# Patient Record
Sex: FEMALE | Race: WHITE | Employment: UNEMPLOYED | URBAN - METROPOLITAN AREA
[De-identification: names, ages, dates, MRNs, and addresses within clinical notes are randomized per-mention and may not be internally consistent; named-entity substitution may affect disease eponyms.]

---

## 2017-02-28 ENCOUNTER — OFFICE VISIT (OUTPATIENT)
Dept: INTERNAL MEDICINE CLINIC | Facility: CLINIC | Age: 37
End: 2017-02-28

## 2017-02-28 VITALS
RESPIRATION RATE: 18 BRPM | WEIGHT: 207 LBS | HEIGHT: 66.5 IN | BODY MASS INDEX: 32.87 KG/M2 | SYSTOLIC BLOOD PRESSURE: 102 MMHG | DIASTOLIC BLOOD PRESSURE: 71 MMHG | TEMPERATURE: 99 F | HEART RATE: 76 BPM

## 2017-02-28 DIAGNOSIS — M25.531 PAIN IN BOTH WRISTS: Primary | ICD-10-CM

## 2017-02-28 DIAGNOSIS — E66.09 OBESITY DUE TO EXCESS CALORIES, UNSPECIFIED OBESITY SEVERITY: ICD-10-CM

## 2017-02-28 DIAGNOSIS — M25.532 PAIN IN BOTH WRISTS: Primary | ICD-10-CM

## 2017-02-28 PROCEDURE — 99214 OFFICE O/P EST MOD 30 MIN: CPT | Performed by: INTERNAL MEDICINE

## 2017-02-28 PROCEDURE — 99212 OFFICE O/P EST SF 10 MIN: CPT | Performed by: INTERNAL MEDICINE

## 2017-02-28 NOTE — PROGRESS NOTES
HPI:    Patient ID: Edy Frazier is a 29year old female. Hand Pain   The pain is present in the right wrist and left elbow. This is a recurrent (10  days    afer  yoga  exercisee   feels pain  in  right  wrist  and  in   fingers   ) problem.  The cu Tympanic membrane, external ear and ear canal normal.   Nose: Nose normal. Right sinus exhibits no maxillary sinus tenderness and no frontal sinus tenderness. Left sinus exhibits no maxillary sinus tenderness and no frontal sinus tenderness.    Mouth/Throat To  Use     Obesity due to excess calories, unspecified obesity severity  Eat healthy, well balanced meals   Reduce daily calorie intake  (1500 calories per day)   Reach and maintain a healthy weight   Reduce salt, fat, sweets and pure sugar in diet   Incl

## 2017-03-01 ENCOUNTER — LAB ENCOUNTER (OUTPATIENT)
Dept: LAB | Facility: HOSPITAL | Age: 37
End: 2017-03-01
Attending: INTERNAL MEDICINE
Payer: COMMERCIAL

## 2017-03-01 DIAGNOSIS — E66.09 OBESITY DUE TO EXCESS CALORIES, UNSPECIFIED OBESITY SEVERITY: ICD-10-CM

## 2017-03-01 DIAGNOSIS — M25.532 PAIN IN BOTH WRISTS: ICD-10-CM

## 2017-03-01 DIAGNOSIS — M25.531 PAIN IN BOTH WRISTS: ICD-10-CM

## 2017-03-01 LAB
ALBUMIN SERPL BCP-MCNC: 4.2 G/DL (ref 3.5–4.8)
ALBUMIN/GLOB SERPL: 1.2 {RATIO} (ref 1–2)
ALP SERPL-CCNC: 41 U/L (ref 32–100)
ALT SERPL-CCNC: 15 U/L (ref 14–54)
ANION GAP SERPL CALC-SCNC: 7 MMOL/L (ref 0–18)
AST SERPL-CCNC: 19 U/L (ref 15–41)
BACTERIA UR QL AUTO: NEGATIVE /HPF
BASOPHILS # BLD: 0 K/UL (ref 0–0.2)
BASOPHILS NFR BLD: 1 %
BILIRUB SERPL-MCNC: 0.9 MG/DL (ref 0.3–1.2)
BILIRUB UR QL: NEGATIVE
BUN SERPL-MCNC: 10 MG/DL (ref 8–20)
BUN/CREAT SERPL: 12 (ref 10–20)
CALCIUM SERPL-MCNC: 9.4 MG/DL (ref 8.5–10.5)
CCP IGG SERPL-ACNC: 1 U/ML (ref 0–6.9)
CHLORIDE SERPL-SCNC: 104 MMOL/L (ref 95–110)
CHOLEST SERPL-MCNC: 158 MG/DL (ref 110–200)
CO2 SERPL-SCNC: 28 MMOL/L (ref 22–32)
COLOR UR: YELLOW
CREAT SERPL-MCNC: 0.83 MG/DL (ref 0.5–1.5)
EOSINOPHIL # BLD: 0.5 K/UL (ref 0–0.7)
EOSINOPHIL NFR BLD: 7 %
ERYTHROCYTE [DISTWIDTH] IN BLOOD BY AUTOMATED COUNT: 13.6 % (ref 11–15)
ERYTHROCYTE [SEDIMENTATION RATE] IN BLOOD: 12 MM/HR (ref 0–20)
GLOBULIN PLAS-MCNC: 3.5 G/DL (ref 2.5–3.7)
GLUCOSE SERPL-MCNC: 88 MG/DL (ref 70–99)
GLUCOSE UR-MCNC: NEGATIVE MG/DL
HCT VFR BLD AUTO: 42.7 % (ref 35–48)
HDLC SERPL-MCNC: 42 MG/DL
HGB BLD-MCNC: 14.3 G/DL (ref 12–16)
KETONES UR-MCNC: NEGATIVE MG/DL
LDLC SERPL CALC-MCNC: 98 MG/DL (ref 0–99)
LYMPHOCYTES # BLD: 2.1 K/UL (ref 1–4)
LYMPHOCYTES NFR BLD: 34 %
MCH RBC QN AUTO: 30 PG (ref 27–32)
MCHC RBC AUTO-ENTMCNC: 33.4 G/DL (ref 32–37)
MCV RBC AUTO: 90 FL (ref 80–100)
MONOCYTES # BLD: 0.4 K/UL (ref 0–1)
MONOCYTES NFR BLD: 7 %
NEUTROPHILS # BLD AUTO: 3.2 K/UL (ref 1.8–7.7)
NEUTROPHILS NFR BLD: 51 %
NITRITE UR QL STRIP.AUTO: NEGATIVE
NONHDLC SERPL-MCNC: 116 MG/DL
OSMOLALITY UR CALC.SUM OF ELEC: 286 MOSM/KG (ref 275–295)
PH UR: 5 [PH] (ref 5–8)
PLATELET # BLD AUTO: 256 K/UL (ref 140–400)
PMV BLD AUTO: 9 FL (ref 7.4–10.3)
POTASSIUM SERPL-SCNC: 4.3 MMOL/L (ref 3.3–5.1)
PROT SERPL-MCNC: 7.7 G/DL (ref 5.9–8.4)
PROT UR-MCNC: NEGATIVE MG/DL
RBC # BLD AUTO: 4.74 M/UL (ref 3.7–5.4)
RBC #/AREA URNS AUTO: 2 /HPF
RHEUMATOID FACT SER QL: <5 IU/ML
SODIUM SERPL-SCNC: 139 MMOL/L (ref 136–144)
SP GR UR STRIP: 1.02 (ref 1–1.03)
TRIGL SERPL-MCNC: 88 MG/DL (ref 1–149)
TSH SERPL-ACNC: 2.81 UIU/ML (ref 0.34–5.6)
URATE SERPL-MCNC: 4.4 MG/DL (ref 2.1–7.4)
UROBILINOGEN UR STRIP-ACNC: <2
VIT C UR-MCNC: NEGATIVE MG/DL
WBC # BLD AUTO: 6.2 K/UL (ref 4–11)
WBC #/AREA URNS AUTO: 4 /HPF

## 2017-03-01 PROCEDURE — 80053 COMPREHEN METABOLIC PANEL: CPT

## 2017-03-01 PROCEDURE — 86431 RHEUMATOID FACTOR QUANT: CPT

## 2017-03-01 PROCEDURE — 80061 LIPID PANEL: CPT

## 2017-03-01 PROCEDURE — 86200 CCP ANTIBODY: CPT

## 2017-03-01 PROCEDURE — 85025 COMPLETE CBC W/AUTO DIFF WBC: CPT

## 2017-03-01 PROCEDURE — 84550 ASSAY OF BLOOD/URIC ACID: CPT

## 2017-03-01 PROCEDURE — 81001 URINALYSIS AUTO W/SCOPE: CPT

## 2017-03-01 PROCEDURE — 85652 RBC SED RATE AUTOMATED: CPT

## 2017-03-01 PROCEDURE — 84443 ASSAY THYROID STIM HORMONE: CPT

## 2017-03-01 PROCEDURE — 36415 COLL VENOUS BLD VENIPUNCTURE: CPT

## 2017-03-01 PROCEDURE — 86038 ANTINUCLEAR ANTIBODIES: CPT

## 2017-03-02 LAB — ANA SER QL: NEGATIVE

## 2017-03-09 ENCOUNTER — TELEPHONE (OUTPATIENT)
Dept: INTERNAL MEDICINE CLINIC | Facility: CLINIC | Age: 37
End: 2017-03-09

## 2017-03-09 ENCOUNTER — OFFICE VISIT (OUTPATIENT)
Dept: ORTHOPEDICS CLINIC | Facility: CLINIC | Age: 37
End: 2017-03-09

## 2017-03-09 DIAGNOSIS — G56.03 BILATERAL CARPAL TUNNEL SYNDROME: Primary | ICD-10-CM

## 2017-03-09 PROCEDURE — 99212 OFFICE O/P EST SF 10 MIN: CPT | Performed by: ORTHOPAEDIC SURGERY

## 2017-03-09 PROCEDURE — 99243 OFF/OP CNSLTJ NEW/EST LOW 30: CPT | Performed by: ORTHOPAEDIC SURGERY

## 2017-03-10 NOTE — TELEPHONE ENCOUNTER
Patient was called and blood test results ( Labs done 03/01/17 ) were explained and reviewed with the patient. Patient verbalized understanding and agreed to medical recommendations made by Dr. Arabella Draper. No further questions/ concerns at this time.

## 2017-03-22 ENCOUNTER — TELEPHONE (OUTPATIENT)
Dept: NEUROLOGY | Facility: CLINIC | Age: 37
End: 2017-03-22

## 2017-03-23 ENCOUNTER — OFFICE VISIT (OUTPATIENT)
Dept: NEUROLOGY | Facility: CLINIC | Age: 37
End: 2017-03-23

## 2017-03-23 VITALS — BODY MASS INDEX: 31.39 KG/M2 | WEIGHT: 200 LBS | HEIGHT: 67 IN

## 2017-03-23 DIAGNOSIS — M79.601 PARESTHESIA AND PAIN OF BOTH UPPER EXTREMITIES: Primary | ICD-10-CM

## 2017-03-23 DIAGNOSIS — M79.602 PARESTHESIA AND PAIN OF BOTH UPPER EXTREMITIES: Primary | ICD-10-CM

## 2017-03-23 DIAGNOSIS — R20.2 PARESTHESIA AND PAIN OF BOTH UPPER EXTREMITIES: Primary | ICD-10-CM

## 2017-03-27 NOTE — PROCEDURES
[address]  [place], [place]anda Mann  Phone: [phone]  Fax: [phone] REPORT          Patient: Teto Serrano YOB: 1980  Patient ID: [id number] Hand Dominance: Wrist APB 2.81 14.3 6.20 Wrist - APB 8        Elbow APB 7.19 14.0 6.20 Elbow - Wrist 25 4.38 57   L MEDIAN - APB      Wrist APB 2.55 10.5 6.04 Wrist - APB 8        Elbow APB 6.46 10.1 6.20 Elbow - Wrist 23 3.91 59   R ULNAR - ADM      Wrist ADM 2.40 10. L. FLEX CARPI RAD N None None None None N N N N   L. FIRST D INTEROSS N None None None None N N N N   L. ABD POLL BREVIS N None None None None N N N N   L. EXT INDICIS N None None None None N N N N                                 -mk

## 2017-04-03 ENCOUNTER — TELEPHONE (OUTPATIENT)
Dept: ORTHOPEDICS CLINIC | Facility: CLINIC | Age: 37
End: 2017-04-03

## 2017-04-18 ENCOUNTER — OFFICE VISIT (OUTPATIENT)
Dept: ORTHOPEDICS CLINIC | Facility: CLINIC | Age: 37
End: 2017-04-18

## 2017-04-18 VITALS — HEART RATE: 96 BPM | DIASTOLIC BLOOD PRESSURE: 74 MMHG | SYSTOLIC BLOOD PRESSURE: 110 MMHG | RESPIRATION RATE: 16 BRPM

## 2017-04-18 DIAGNOSIS — G56.03 BILATERAL CARPAL TUNNEL SYNDROME: Primary | ICD-10-CM

## 2017-04-18 DIAGNOSIS — G56.23 CUBITAL TUNNEL SYNDROME, BILATERAL: ICD-10-CM

## 2017-04-18 PROCEDURE — 20526 THER INJECTION CARP TUNNEL: CPT | Performed by: ORTHOPAEDIC SURGERY

## 2017-04-18 PROCEDURE — 99213 OFFICE O/P EST LOW 20 MIN: CPT | Performed by: ORTHOPAEDIC SURGERY

## 2017-04-18 RX ORDER — BETAMETHASONE SODIUM PHOSPHATE AND BETAMETHASONE ACETATE 3; 3 MG/ML; MG/ML
18 INJECTION, SUSPENSION INTRA-ARTICULAR; INTRALESIONAL; INTRAMUSCULAR; SOFT TISSUE ONCE
Status: COMPLETED | OUTPATIENT
Start: 2017-04-18 | End: 2017-04-18

## 2017-04-18 RX ADMIN — BETAMETHASONE SODIUM PHOSPHATE AND BETAMETHASONE ACETATE 18 MG: 3; 3 INJECTION, SUSPENSION INTRA-ARTICULAR; INTRALESIONAL; INTRAMUSCULAR; SOFT TISSUE at 10:10:00

## 2017-04-18 NOTE — PROGRESS NOTES
Per verbal order from [facility], draw up 1.5ml of 1% lidocaine and 1.5ml of Celestone for cortisone injection to right wrist. Carin Higgins RN

## 2017-04-18 NOTE — PROGRESS NOTES
4/18/2017  Nichelle Jane  8/19/1980  29year old   female  Deisy Mast MD    HPI:   Patient presents with:  Test Results: Pt here for EMG results. The patient complains of pain located bilateral hands. The pain is the same.   The patient repo The patient will monitor the results of the injection. She will continue her night splint and try to avoid elbow flexion and medial elbow pressure. All of their questions were answered and they are in agreement with the treatment plan.     The patient w

## 2017-04-18 NOTE — PROCEDURES
The patient desired injection. Under sterile conditions and following informed consent, the patient was injected with lidocaine and celestone in their bilateral carpal tunnels. The patient tolerated the procedure well and there were no complications.   Chiqui Guillermo

## 2017-11-01 ENCOUNTER — TELEPHONE (OUTPATIENT)
Dept: PEDIATRICS CLINIC | Facility: CLINIC | Age: 37
End: 2017-11-01

## 2017-11-01 DIAGNOSIS — N92.6 IRREGULAR MENSTRUAL CYCLE: ICD-10-CM

## 2017-11-01 DIAGNOSIS — Z30.431 SURVEILLANCE OF PREVIOUSLY PRESCRIBED INTRAUTERINE CONTRACEPTIVE DEVICE: Primary | ICD-10-CM

## 2017-11-01 NOTE — TELEPHONE ENCOUNTER
PT NOTIFIED OF RECS. SINCE PELVIC ULTRASOUND ORDER  YESTERDAY ANOTHER ORDER HAS BEEN PLACED. ADVISED IF SHE BECOMES SYMPTOMATIC (CHEST PAIN, SOB, HEART PALPITATIONS OR LIGHTHEADEDNESS) TO GO TO THE ER.   I GAVE HER THE PHONE NUMBER FOR CENTRAL SCHE

## 2017-11-01 NOTE — TELEPHONE ENCOUNTER
C/O PARAGARD IUD X 4 YEARS. STATES REGULAR, [facility] UNTIL October. BEGAN BLEEDING 2 WEEKS EARLY ON 10-18 AND BLEEDING GOT HEAVIER AS OF 10-20. HAS BEEN SATURATING A PAD Q 3 HOURS FOR 4-5 DAYS NOW.   PT HAS NOT DONE ANY HPT AND IS N

## 2017-11-01 NOTE — TELEPHONE ENCOUNTER
Pt has IUD for 4 years . Pt is now spotting since oct 18. And then started bleeding  Since oct 20th , heavy flow .  Please advise

## 2017-11-01 NOTE — TELEPHONE ENCOUNTER
If she has been under stress, not unusual to have 2 periods in a month. Would monitor. But [facility] ordered her pelvic u/s in 10/2016 that she has not done.   Would do u/s

## 2017-11-06 ENCOUNTER — HOSPITAL ENCOUNTER (OUTPATIENT)
Dept: ULTRASOUND IMAGING | Facility: HOSPITAL | Age: 37
Discharge: HOME | End: 2017-11-06
Attending: OBSTETRICS & GYNECOLOGY
Payer: COMMERCIAL

## 2017-11-06 DIAGNOSIS — N92.6 IRREGULAR MENSTRUAL CYCLE: ICD-10-CM

## 2017-11-06 DIAGNOSIS — Z30.431 SURVEILLANCE OF PREVIOUSLY PRESCRIBED INTRAUTERINE CONTRACEPTIVE DEVICE: ICD-10-CM

## 2017-11-06 PROCEDURE — 76830 TRANSVAGINAL US NON-OB: CPT | Performed by: OBSTETRICS & GYNECOLOGY

## 2017-11-06 PROCEDURE — 76856 US EXAM PELVIC COMPLETE: CPT | Performed by: OBSTETRICS & GYNECOLOGY

## 2017-11-08 ENCOUNTER — TELEPHONE (OUTPATIENT)
Dept: OBGYN CLINIC | Facility: CLINIC | Age: 37
End: 2017-11-08

## 2017-11-08 NOTE — TELEPHONE ENCOUNTER
Pt calling for US results. Pt states she has had heavy bleeding for 21 of the past 25 days. She is aware to go to ER if SOB, CP, or heart palpitations. She denies these symptoms at this time but states she is really tired of bleeding and looking for recs.

## 2017-11-08 NOTE — TELEPHONE ENCOUNTER
Pt notified of results and recs per Cedric Goldberg 8141. Pt accepted appt with VALENTINA on 11/10/17.

## 2017-11-08 NOTE — TELEPHONE ENCOUNTER
Pelvic u/s essentially normal.  Paragard IUD can make periods heavy. MAF pt. I last saw pt in 2012.   Needs to be seen for recommendations

## 2017-11-09 ENCOUNTER — LAB ENCOUNTER (OUTPATIENT)
Dept: LAB | Facility: HOSPITAL | Age: 37
End: 2017-11-09
Attending: OBSTETRICS & GYNECOLOGY
Payer: COMMERCIAL

## 2017-11-09 ENCOUNTER — OFFICE VISIT (OUTPATIENT)
Dept: OBGYN CLINIC | Facility: CLINIC | Age: 37
End: 2017-11-09

## 2017-11-09 VITALS
HEIGHT: 65.75 IN | BODY MASS INDEX: 33.7 KG/M2 | WEIGHT: 207.19 LBS | DIASTOLIC BLOOD PRESSURE: 74 MMHG | HEART RATE: 83 BPM | SYSTOLIC BLOOD PRESSURE: 110 MMHG

## 2017-11-09 DIAGNOSIS — Z01.419 ENCOUNTER FOR GYNECOLOGICAL EXAMINATION WITHOUT ABNORMAL FINDING: Primary | ICD-10-CM

## 2017-11-09 DIAGNOSIS — N92.0 MENORRHAGIA WITH REGULAR CYCLE: ICD-10-CM

## 2017-11-09 DIAGNOSIS — Z30.431 IUD CHECK UP: ICD-10-CM

## 2017-11-09 PROCEDURE — 36415 COLL VENOUS BLD VENIPUNCTURE: CPT

## 2017-11-09 PROCEDURE — 84443 ASSAY THYROID STIM HORMONE: CPT

## 2017-11-09 PROCEDURE — 85027 COMPLETE CBC AUTOMATED: CPT

## 2017-11-09 PROCEDURE — 84703 CHORIONIC GONADOTROPIN ASSAY: CPT

## 2017-11-09 PROCEDURE — 99395 PREV VISIT EST AGE 18-39: CPT | Performed by: OBSTETRICS & GYNECOLOGY

## 2017-11-09 RX ORDER — MEDROXYPROGESTERONE ACETATE 10 MG/1
10 TABLET ORAL DAILY
Qty: 10 TABLET | Refills: 0 | Status: SHIPPED | OUTPATIENT
Start: 2017-11-09 | End: 2019-03-15 | Stop reason: ALTCHOICE

## 2017-11-09 NOTE — PROGRESS NOTES
Duarte Leon is a 40year old female [id number] Patient's last menstrual period was 10/18/2017. Patient presents with:  Gyn Exam: annual exam  Gyn Problem: prolonged menses  . Last period lasted 3 weeks & heavy.  Wanted paragard to avoid hormones since did Comment: SOCIALLY    Drug use: No    Sexual activity: Yes    Partners: Male    Birth control/ protection: Paragard    Comment: PARAGARD 12/21/2012     Other Topics Concern    Caffeine Concern Yes    Comment: 1 cup coffee daily     Social History Narrat soft, nontender, nondistended, no masses  Skin/Hair:  no unusual rashes or bruises  Extremities:  no edema, no cyanosis  Psychiatric:   oriented to time, place, person and situation.  Appropriate mood and affect    Pelvic Exam:  External Genitalia:  normal

## 2019-03-15 ENCOUNTER — OFFICE VISIT (OUTPATIENT)
Dept: FAMILY MEDICINE CLINIC | Facility: CLINIC | Age: 39
End: 2019-03-15
Payer: COMMERCIAL

## 2019-03-15 VITALS
TEMPERATURE: 98 F | WEIGHT: 213 LBS | DIASTOLIC BLOOD PRESSURE: 72 MMHG | SYSTOLIC BLOOD PRESSURE: 118 MMHG | BODY MASS INDEX: 34.64 KG/M2 | HEIGHT: 65.75 IN | RESPIRATION RATE: 16 BRPM | OXYGEN SATURATION: 99 % | HEART RATE: 80 BPM

## 2019-03-15 DIAGNOSIS — R53.83 OTHER FATIGUE: ICD-10-CM

## 2019-03-15 DIAGNOSIS — Z97.5 IUD (INTRAUTERINE DEVICE) IN PLACE: ICD-10-CM

## 2019-03-15 DIAGNOSIS — Z13.39 ALCOHOL SCREENING: ICD-10-CM

## 2019-03-15 DIAGNOSIS — E66.9 OBESITY (BMI 30.0-34.9): ICD-10-CM

## 2019-03-15 DIAGNOSIS — Z71.82 EXERCISE COUNSELING: ICD-10-CM

## 2019-03-15 DIAGNOSIS — N92.1 MENORRHAGIA WITH IRREGULAR CYCLE: ICD-10-CM

## 2019-03-15 DIAGNOSIS — Z00.00 WELL ADULT EXAM: Primary | ICD-10-CM

## 2019-03-15 DIAGNOSIS — Z13.220 LIPID SCREENING: ICD-10-CM

## 2019-03-15 DIAGNOSIS — Z76.89 ENCOUNTER TO ESTABLISH CARE: ICD-10-CM

## 2019-03-15 DIAGNOSIS — Z71.85 VACCINE COUNSELING: ICD-10-CM

## 2019-03-15 PROBLEM — G56.23 CUBITAL TUNNEL SYNDROME, BILATERAL: Status: RESOLVED | Noted: 2017-04-18 | Resolved: 2019-03-15

## 2019-03-15 PROBLEM — M25.531 PAIN IN BOTH WRISTS: Status: RESOLVED | Noted: 2017-02-28 | Resolved: 2019-03-15

## 2019-03-15 PROBLEM — G56.03 BILATERAL CARPAL TUNNEL SYNDROME: Status: RESOLVED | Noted: 2017-03-09 | Resolved: 2019-03-15

## 2019-03-15 PROBLEM — M25.532 PAIN IN BOTH WRISTS: Status: RESOLVED | Noted: 2017-02-28 | Resolved: 2019-03-15

## 2019-03-15 PROCEDURE — 99385 PREV VISIT NEW AGE 18-39: CPT | Performed by: FAMILY MEDICINE

## 2019-03-15 PROCEDURE — 99203 OFFICE O/P NEW LOW 30 MIN: CPT | Performed by: FAMILY MEDICINE

## 2019-03-15 RX ORDER — COPPER 313.4 MG/1
INTRAUTERINE DEVICE INTRAUTERINE
COMMUNITY

## 2019-03-15 NOTE — PATIENT INSTRUCTIONS
Prevention Guidelines, Women Ages 25 to 44  Screening tests and vaccines are an important part of managing your health. A screening test is done to find possible disorders or diseases in people who don't have any symptoms.  The goal is to find a disease e Type 2 diabetes All women with prediabetes Every year   Gonorrhea Sexually active women at increased risk for infection At routine exams   Hepatitis C Anyone at increased risk At routine exams   HIV All women should be tested at least once for HIV between Meningococcal Women at increased risk for infection should talk with their healthcare provider 1 or more doses   Pneumococcal conjugate vaccine (PCV13) and pneumococcal polysaccharide vaccine (PPSV23) Women at increased risk for infection should talk with © 1784-5586 [employer] [address]. [facility] [facility] [place] [place]. All rights reserved. This information is not intended as a substitute for professional medical care. Always follow your healthcare professional's instructions.

## 2019-03-15 NOTE — PROGRESS NOTES
Patient presents with:  Physical: Routine physical- pap needed however she is ending her period and she will schedule another appointment when she is completely done with her period.  flu shot and Tdap:UTD      HPI:    Patient is here for a physical. She wi Hematological: Negative for adenopathy. Does not bruise/bleed easily. Psychiatric/Behavioral: The patient is not nervous/anxious. No depression.     Patient Active Problem List:     Obesity due to excess calories     Menorrhagia with irregular cycle respiratory distress. No wheezes, rhonchi or rales  Abdominal: Soft. Bowel sounds are normal. Non tender, no masses, no organomegaly   Musculoskeletal: Normal range of motion. No edema and no tenderness. Lymphadenopathy: No cervical adenopathy.    Neurolo screening  Encounter to establish care  Exercise counseling  Iud (intrauterine device) in place  Other fatigue  Lipid screening  Obesity (bmi 30.0-34.9)     - -Discussed weight loss methods including diet and exercise, counseled on safe sex, std risks, vac

## 2019-03-22 ENCOUNTER — OFFICE VISIT (OUTPATIENT)
Dept: FAMILY MEDICINE CLINIC | Facility: CLINIC | Age: 39
End: 2019-03-22
Payer: COMMERCIAL

## 2019-03-22 ENCOUNTER — LAB ENCOUNTER (OUTPATIENT)
Dept: LAB | Age: 39
End: 2019-03-22
Attending: FAMILY MEDICINE
Payer: COMMERCIAL

## 2019-03-22 VITALS
TEMPERATURE: 98 F | HEART RATE: 74 BPM | WEIGHT: 213 LBS | DIASTOLIC BLOOD PRESSURE: 80 MMHG | RESPIRATION RATE: 16 BRPM | HEIGHT: 65.75 IN | BODY MASS INDEX: 34.64 KG/M2 | SYSTOLIC BLOOD PRESSURE: 122 MMHG | OXYGEN SATURATION: 99 %

## 2019-03-22 DIAGNOSIS — Z13.31 DEPRESSION SCREENING: ICD-10-CM

## 2019-03-22 DIAGNOSIS — E66.9 OBESITY (BMI 30.0-34.9): ICD-10-CM

## 2019-03-22 DIAGNOSIS — R53.83 OTHER FATIGUE: ICD-10-CM

## 2019-03-22 DIAGNOSIS — Z11.3 SCREEN FOR STD (SEXUALLY TRANSMITTED DISEASE): ICD-10-CM

## 2019-03-22 DIAGNOSIS — Z12.4 CERVICAL CANCER SCREENING: ICD-10-CM

## 2019-03-22 DIAGNOSIS — Z00.00 WELL ADULT EXAM: ICD-10-CM

## 2019-03-22 DIAGNOSIS — Z01.419 WELL WOMAN EXAM WITH ROUTINE GYNECOLOGICAL EXAM: Primary | ICD-10-CM

## 2019-03-22 LAB
ALBUMIN SERPL-MCNC: 4 G/DL (ref 3.4–5)
ALBUMIN/GLOB SERPL: 1 {RATIO} (ref 1–2)
ALP LIVER SERPL-CCNC: 56 U/L (ref 37–98)
ALT SERPL-CCNC: 35 U/L (ref 13–56)
ANION GAP SERPL CALC-SCNC: 6 MMOL/L (ref 0–18)
AST SERPL-CCNC: 24 U/L (ref 15–37)
BASOPHILS # BLD AUTO: 0.05 X10(3) UL (ref 0–0.2)
BASOPHILS NFR BLD AUTO: 0.9 %
BILIRUB SERPL-MCNC: 0.5 MG/DL (ref 0.1–2)
BUN BLD-MCNC: 10 MG/DL (ref 7–18)
BUN/CREAT SERPL: 11.5 (ref 10–20)
CALCIUM BLD-MCNC: 9 MG/DL (ref 8.5–10.1)
CHLORIDE SERPL-SCNC: 107 MMOL/L (ref 98–107)
CHOLEST SMN-MCNC: 187 MG/DL (ref ?–200)
CO2 SERPL-SCNC: 27 MMOL/L (ref 21–32)
CREAT BLD-MCNC: 0.87 MG/DL (ref 0.55–1.02)
DEPRECATED RDW RBC AUTO: 43.8 FL (ref 35.1–46.3)
EOSINOPHIL # BLD AUTO: 0.64 X10(3) UL (ref 0–0.7)
EOSINOPHIL NFR BLD AUTO: 11.3 %
ERYTHROCYTE [DISTWIDTH] IN BLOOD BY AUTOMATED COUNT: 12.7 % (ref 11–15)
EST. AVERAGE GLUCOSE BLD GHB EST-MCNC: 111 MG/DL (ref 68–126)
GLOBULIN PLAS-MCNC: 4 G/DL (ref 2.8–4.4)
GLUCOSE BLD-MCNC: 65 MG/DL (ref 70–99)
HBA1C MFR BLD HPLC: 5.5 % (ref ?–5.7)
HCT VFR BLD AUTO: 42.7 % (ref 35–48)
HDLC SERPL-MCNC: 55 MG/DL (ref 40–59)
HGB BLD-MCNC: 14 G/DL (ref 12–16)
IMM GRANULOCYTES # BLD AUTO: 0.01 X10(3) UL (ref 0–1)
IMM GRANULOCYTES NFR BLD: 0.2 %
LDLC SERPL CALC-MCNC: 117 MG/DL (ref ?–100)
LYMPHOCYTES # BLD AUTO: 1.98 X10(3) UL (ref 1–4)
LYMPHOCYTES NFR BLD AUTO: 35.1 %
M PROTEIN MFR SERPL ELPH: 8 G/DL (ref 6.4–8.2)
MCH RBC QN AUTO: 30.6 PG (ref 26–34)
MCHC RBC AUTO-ENTMCNC: 32.8 G/DL (ref 31–37)
MCV RBC AUTO: 93.2 FL (ref 80–100)
MONOCYTES # BLD AUTO: 0.49 X10(3) UL (ref 0.1–1)
MONOCYTES NFR BLD AUTO: 8.7 %
NEUTROPHILS # BLD AUTO: 2.47 X10 (3) UL (ref 1.5–7.7)
NEUTROPHILS # BLD AUTO: 2.47 X10(3) UL (ref 1.5–7.7)
NEUTROPHILS NFR BLD AUTO: 43.8 %
NONHDLC SERPL-MCNC: 132 MG/DL (ref ?–130)
OSMOLALITY SERPL CALC.SUM OF ELEC: 287 MOSM/KG (ref 275–295)
PLATELET # BLD AUTO: 293 10(3)UL (ref 150–450)
POTASSIUM SERPL-SCNC: 4.5 MMOL/L (ref 3.5–5.1)
RBC # BLD AUTO: 4.58 X10(6)UL (ref 3.8–5.3)
SODIUM SERPL-SCNC: 140 MMOL/L (ref 136–145)
TRIGL SERPL-MCNC: 74 MG/DL (ref 30–149)
TSI SER-ACNC: 2.85 MIU/ML (ref 0.36–3.74)
VLDLC SERPL CALC-MCNC: 15 MG/DL (ref 0–30)
WBC # BLD AUTO: 5.6 X10(3) UL (ref 4–11)

## 2019-03-22 PROCEDURE — 83036 HEMOGLOBIN GLYCOSYLATED A1C: CPT | Performed by: FAMILY MEDICINE

## 2019-03-22 PROCEDURE — 87624 HPV HI-RISK TYP POOLED RSLT: CPT | Performed by: FAMILY MEDICINE

## 2019-03-22 PROCEDURE — 36415 COLL VENOUS BLD VENIPUNCTURE: CPT | Performed by: FAMILY MEDICINE

## 2019-03-22 PROCEDURE — 88175 CYTOPATH C/V AUTO FLUID REDO: CPT | Performed by: FAMILY MEDICINE

## 2019-03-22 PROCEDURE — 87491 CHLMYD TRACH DNA AMP PROBE: CPT | Performed by: FAMILY MEDICINE

## 2019-03-22 PROCEDURE — 99395 PREV VISIT EST AGE 18-39: CPT | Performed by: FAMILY MEDICINE

## 2019-03-22 PROCEDURE — 87591 N.GONORRHOEAE DNA AMP PROB: CPT | Performed by: FAMILY MEDICINE

## 2019-03-22 NOTE — PROGRESS NOTES
HPI:   Shawn Michaud is a 41year old female that presents for well woman exam.     Pap: 2 years ago and it was normal , hx of abnormal: No  STI testing desired: Yes  Menstrual Issues: Heavy menses as noted on last physical. She will be getting a pelvic discharge, cervix normal appearing   EXTREMITIES:  No edema, no cyanosis, no clubbing, 2+ dorsalis pedis pulses bilaterally. NEURO:  Grossly normal, gait stable     ASSESSMENT AND PLAN:      1.  Well woman exam with routine gynecological exam  - -Discussed

## 2019-03-24 LAB
C TRACH DNA SPEC QL NAA+PROBE: NEGATIVE
HPV I/H RISK 1 DNA SPEC QL NAA+PROBE: NEGATIVE
N GONORRHOEA DNA SPEC QL NAA+PROBE: NEGATIVE

## 2019-04-01 ENCOUNTER — OFFICE VISIT (OUTPATIENT)
Dept: FAMILY MEDICINE CLINIC | Facility: CLINIC | Age: 39
End: 2019-04-01
Payer: COMMERCIAL

## 2019-04-01 VITALS
HEIGHT: 65.75 IN | WEIGHT: 215 LBS | TEMPERATURE: 98 F | DIASTOLIC BLOOD PRESSURE: 68 MMHG | RESPIRATION RATE: 17 BRPM | HEART RATE: 74 BPM | BODY MASS INDEX: 34.97 KG/M2 | OXYGEN SATURATION: 98 % | SYSTOLIC BLOOD PRESSURE: 120 MMHG

## 2019-04-01 DIAGNOSIS — H10.13 ACUTE ATOPIC CONJUNCTIVITIS OF BOTH EYES: Primary | ICD-10-CM

## 2019-04-01 PROCEDURE — 99213 OFFICE O/P EST LOW 20 MIN: CPT | Performed by: PHYSICIAN ASSISTANT

## 2019-04-01 RX ORDER — OLOPATADINE HYDROCHLORIDE 2 MG/ML
1 SOLUTION/ DROPS OPHTHALMIC DAILY
Qty: 1 BOTTLE | Refills: 0 | Status: SHIPPED | OUTPATIENT
Start: 2019-04-01 | End: 2019-04-08

## 2019-04-01 NOTE — PROGRESS NOTES
CHIEF COMPLAINT:   Patient presents with:  Eye Pain: R eye hurts\" crusted and swollen this morning . HPI:   Eva Mccurdy is a 41year old female who presents with chief complaint of b/l eye irritation for 2 days. Right is worse then the left.  3 otherwise  SKIN: no rashes  EYES:  See HPI  HENT: denies ear pain, congestion, sore throat  LUNGS: denies shortness of breath or cough  CARDIOVASCULAR: denies chest pain or palpitations   GI: denies N/V/C or abdominal pain    EXAM:   /68   Pulse 74 drop daily   Ok to continue antihistamine   Please follow up with PCP if no improvement or if symptoms worsen

## 2019-04-01 NOTE — PATIENT INSTRUCTIONS
Eye drop daily   Ok to continue antihistamine   Please follow up with PCP if no improvement or if symptoms worsen

## 2019-05-13 ENCOUNTER — OFFICE VISIT (OUTPATIENT)
Dept: FAMILY MEDICINE CLINIC | Facility: CLINIC | Age: 39
End: 2019-05-13
Payer: COMMERCIAL

## 2019-05-13 VITALS
OXYGEN SATURATION: 98 % | BODY MASS INDEX: 34.48 KG/M2 | HEIGHT: 65.75 IN | TEMPERATURE: 98 F | SYSTOLIC BLOOD PRESSURE: 100 MMHG | WEIGHT: 212 LBS | RESPIRATION RATE: 16 BRPM | HEART RATE: 81 BPM | DIASTOLIC BLOOD PRESSURE: 70 MMHG

## 2019-05-13 DIAGNOSIS — R05.8 SPASMODIC COUGH: ICD-10-CM

## 2019-05-13 DIAGNOSIS — H61.21 IMPACTED CERUMEN OF RIGHT EAR: ICD-10-CM

## 2019-05-13 DIAGNOSIS — J06.9 VIRAL URI WITH COUGH: ICD-10-CM

## 2019-05-13 DIAGNOSIS — J02.9 SORE THROAT: Primary | ICD-10-CM

## 2019-05-13 PROCEDURE — 99213 OFFICE O/P EST LOW 20 MIN: CPT | Performed by: PHYSICIAN ASSISTANT

## 2019-05-13 PROCEDURE — 87880 STREP A ASSAY W/OPTIC: CPT | Performed by: PHYSICIAN ASSISTANT

## 2019-05-13 RX ORDER — FLUTICASONE PROPIONATE 50 MCG
2 SPRAY, SUSPENSION (ML) NASAL DAILY
Qty: 1 INHALER | Refills: 0 | Status: SHIPPED | OUTPATIENT
Start: 2019-05-13 | End: 2019-10-18 | Stop reason: ALTCHOICE

## 2019-05-13 RX ORDER — BENZONATATE 200 MG/1
200 CAPSULE ORAL 3 TIMES DAILY PRN
Qty: 30 CAPSULE | Refills: 0 | Status: SHIPPED | OUTPATIENT
Start: 2019-05-13 | End: 2019-05-23

## 2019-05-13 RX ORDER — ALBUTEROL SULFATE 90 UG/1
1-2 AEROSOL, METERED RESPIRATORY (INHALATION) EVERY 6 HOURS PRN
Qty: 1 INHALER | Refills: 0 | Status: SHIPPED | OUTPATIENT
Start: 2019-05-13 | End: 2019-10-18 | Stop reason: ALTCHOICE

## 2019-05-13 NOTE — PROGRESS NOTES
CHIEF COMPLAINT:   Patient presents with:  Cough: productive cough, sore throat, right ear pain, sinus pressure, x2 day      HPI:   Bobo Levy is a 41year old female who presents for URI symptoms for  1.5 days.   Patient reports nasal congestion, sin LUNGS: denies shortness of breath or wheezing, See HPI  CARDIOVASCULAR: denies chest pain or palpitations   GI: denies N/V/C or abdominal pain  NEURO: + headaches    EXAM:   /70   Pulse 81   Temp 98.3 °F (36.8 °C) (Oral)   Resp 16   Ht 65.75\"   Wt 2 Prep Hydrogen Peroxide  Otoscope visualization of cerumen and the curette was not used to remove the wax  Patient Status: Tolerated well.   No complications      ASSESSMENT AND PLAN:   Manuel Kelly is a 41year old female who presents with symptoms that · If symptoms are severe, rest at home for the first 2 to 3 days. When you resume activity, don't let yourself get too tired. · Don't smoke. If you need help stopping, talk with your healthcare provider.   · Avoid being exposed to cigarette smoke (yours or Date Last Reviewed: 6/1/2018  © 8156-5458 [employer] [address]. [address] [facility] [place] [address] [place]. All rights reserved. This information is not intended as a substitute for professional medical care.  Always follow your healthcare professional'

## 2019-05-13 NOTE — PATIENT INSTRUCTIONS
-Push fluids  -Cool mist humidifier at night  -Sudafed  -Motrin  -Go to ER with worsening symptoms      Viral Upper Respiratory Illness (Adult)    You have a viral upper respiratory illness (URI), which is another term for the common cold.  This illness is your healthcare provider or pharmacist which over-the-counter medicines are safe to use. (Note: Don't use decongestants if you have high blood pressure.)  Follow-up care  Follow up with your healthcare provider, or as advised.   When to seek medical advice

## 2019-10-18 ENCOUNTER — OFFICE VISIT (OUTPATIENT)
Dept: FAMILY MEDICINE CLINIC | Facility: CLINIC | Age: 39
End: 2019-10-18
Payer: COMMERCIAL

## 2019-10-18 VITALS
SYSTOLIC BLOOD PRESSURE: 112 MMHG | TEMPERATURE: 98 F | WEIGHT: 211 LBS | DIASTOLIC BLOOD PRESSURE: 72 MMHG | BODY MASS INDEX: 34.32 KG/M2 | HEART RATE: 74 BPM | OXYGEN SATURATION: 99 % | RESPIRATION RATE: 14 BRPM | HEIGHT: 65.75 IN

## 2019-10-18 DIAGNOSIS — G56.03 BILATERAL CARPAL TUNNEL SYNDROME: ICD-10-CM

## 2019-10-18 DIAGNOSIS — M77.11 LATERAL EPICONDYLITIS OF RIGHT ELBOW: Primary | ICD-10-CM

## 2019-10-18 PROCEDURE — 99214 OFFICE O/P EST MOD 30 MIN: CPT | Performed by: FAMILY MEDICINE

## 2019-10-18 NOTE — PROGRESS NOTES
HPI:    Shawn Michaud is a 46year old female. Patient presents with:  Carpal Tunnel Syndrome: Hand pain. - She was given cortisone shots in both wrists 4 years ago and now is getting sharp pains in her right wrist up her arm.  Health maintenance is up to HPI.  GENERAL HEALTH: denies weight loss of significance, denies fatigue, fever or chills.   HENT: denies blurry vision, double vision, hearing ailments, dysphagia, odynophagia  SKIN: denies any unusual skin lesions or rashes  RESPIRATORY: denies shortness TENDERNESS:  Focal tenderness right wrist. No focal tenderness elsewhere. No observable/palpable soft tissue mass. . Palpated defects absent. Exam: has tenderness at right lateral epicondyle.    NEUROVASCULAR: Distal pulses intact, capillary refill < 2 sec

## 2019-10-18 NOTE — PATIENT INSTRUCTIONS
Understanding Lateral Epicondylitis    Tendons are strong bands of tissue that connect muscles to bones. Lateral epicondylitis affects the tendons that connect muscles in the forearm to the lateral epicondyle.  This is the bony knob on the outer side of t · Taking pain medicines. Taking prescription or over-the-counter pain medicines may help reduce pain and swelling.    · Wearing a brace. This helps reduce strain on the muscles and tendons in the forearm, which may relieve symptoms.  It is very important to With carpal tunnel syndrome, the tendons or nearby tissues within the carpal tunnel may swell or thicken. Or the transverse carpal ligament may harden and shorten. This narrows the space in the carpal tunnel and puts pressure on the median nerve.  This pres · Surgery. If the condition doesn’t respond to other treatments and doesn’t go away on its own, you may need surgery.  During surgery, the surgeon cuts the transverse carpal ligament to relieve pressure on the median nerve.     When to call your healthcare

## 2019-10-19 PROBLEM — G56.03 BILATERAL CARPAL TUNNEL SYNDROME: Status: ACTIVE | Noted: 2019-10-19

## 2019-10-19 PROBLEM — M77.11 LATERAL EPICONDYLITIS OF RIGHT ELBOW: Status: ACTIVE | Noted: 2019-10-19

## 2020-06-18 ENCOUNTER — OFFICE VISIT (OUTPATIENT)
Dept: OBGYN CLINIC | Facility: CLINIC | Age: 40
End: 2020-06-18
Payer: COMMERCIAL

## 2020-06-18 VITALS
SYSTOLIC BLOOD PRESSURE: 116 MMHG | DIASTOLIC BLOOD PRESSURE: 70 MMHG | HEIGHT: 65.75 IN | BODY MASS INDEX: 32.53 KG/M2 | WEIGHT: 200 LBS

## 2020-06-18 DIAGNOSIS — N94.6 DYSMENORRHEA: ICD-10-CM

## 2020-06-18 DIAGNOSIS — N92.1 MENORRHAGIA WITH IRREGULAR CYCLE: Primary | ICD-10-CM

## 2020-06-18 DIAGNOSIS — L65.9 HAIR LOSS: ICD-10-CM

## 2020-06-18 PROCEDURE — 99203 OFFICE O/P NEW LOW 30 MIN: CPT | Performed by: OBSTETRICS & GYNECOLOGY

## 2020-06-19 NOTE — PROGRESS NOTES
OB/GYN H&P       CC: Patient presents with:  Irregular Periods: Patient has had irregular cycles in the past sometimes late a week after / more iregular since Jan 2020 , patient had no cycle in May , hair loss       HPI: Jimi Bucio is a 46year old G History of pregnancy 2004, 2006, 2010, 2012    2004-SAB; 2006-17 hr labor , Rajinder Page ([facility]); 2010-6 hr labor , \"Kelly\" DWAYNE; 2012-, APGAR:9 (1 min) 9 (5 min)  (10 min)  -1st degree perineal laceration    • Menorrhagia with irr [facility]    LH (LUTEINIZING HORMONE)    TSH W REFLEX TO FREE T4    PROLACTIN    US TRANSVAG/ABDOMINAL EMG ONLY      Other Visit Diagnoses     Dysmenorrhea        Relevant Orders    US TRANSVAG/ABDOMINAL EMG ONLY    Hair loss        Relevant Orders    VITAMIN D,

## 2020-06-24 ENCOUNTER — APPOINTMENT (OUTPATIENT)
Dept: OBGYN CLINIC | Facility: CLINIC | Age: 40
End: 2020-06-24
Payer: COMMERCIAL

## 2020-06-24 DIAGNOSIS — N94.6 DYSMENORRHEA: ICD-10-CM

## 2020-06-24 DIAGNOSIS — N92.1 MENORRHAGIA WITH IRREGULAR CYCLE: ICD-10-CM

## 2020-06-24 PROCEDURE — 76830 TRANSVAGINAL US NON-OB: CPT | Performed by: OBSTETRICS & GYNECOLOGY

## 2020-06-24 PROCEDURE — 76856 US EXAM PELVIC COMPLETE: CPT | Performed by: OBSTETRICS & GYNECOLOGY

## 2020-06-25 ENCOUNTER — LAB ENCOUNTER (OUTPATIENT)
Dept: LAB | Age: 40
End: 2020-06-25
Attending: OBSTETRICS & GYNECOLOGY
Payer: COMMERCIAL

## 2020-06-25 DIAGNOSIS — N92.1 MENORRHAGIA WITH IRREGULAR CYCLE: ICD-10-CM

## 2020-06-25 DIAGNOSIS — L65.9 HAIR LOSS: ICD-10-CM

## 2020-06-25 PROCEDURE — 83002 ASSAY OF GONADOTROPIN (LH): CPT

## 2020-06-25 PROCEDURE — 82306 VITAMIN D 25 HYDROXY: CPT

## 2020-06-25 PROCEDURE — 84146 ASSAY OF PROLACTIN: CPT

## 2020-06-25 PROCEDURE — 84443 ASSAY THYROID STIM HORMONE: CPT

## 2020-06-25 PROCEDURE — 83001 ASSAY OF GONADOTROPIN (FSH): CPT

## 2020-06-25 PROCEDURE — 36415 COLL VENOUS BLD VENIPUNCTURE: CPT

## 2020-06-26 NOTE — PROGRESS NOTES
Reviewed result. FSH is high, which indicates menopause. Going through it before 36years of age is considered \"Prmature\" and could be caused by a few possible reasons:  1) familial: 10% of cases.  Check with the women in your immediate family and inqui

## 2020-09-23 ENCOUNTER — IMMUNIZATION (OUTPATIENT)
Dept: FAMILY MEDICINE CLINIC | Facility: CLINIC | Age: 40
End: 2020-09-23
Payer: COMMERCIAL

## 2020-09-23 DIAGNOSIS — Z23 NEED FOR VACCINATION: ICD-10-CM

## 2020-09-23 PROCEDURE — 90471 IMMUNIZATION ADMIN: CPT | Performed by: FAMILY MEDICINE

## 2020-09-23 PROCEDURE — 90686 IIV4 VACC NO PRSV 0.5 ML IM: CPT | Performed by: FAMILY MEDICINE

## 2020-10-07 ENCOUNTER — TELEPHONE (OUTPATIENT)
Dept: FAMILY MEDICINE CLINIC | Facility: CLINIC | Age: 40
End: 2020-10-07

## 2020-10-07 ENCOUNTER — VIRTUAL PHONE E/M (OUTPATIENT)
Dept: FAMILY MEDICINE CLINIC | Facility: CLINIC | Age: 40
End: 2020-10-07
Payer: COMMERCIAL

## 2020-10-07 DIAGNOSIS — Z20.822 CLOSE EXPOSURE TO COVID-19 VIRUS: Primary | ICD-10-CM

## 2020-10-07 PROCEDURE — 99213 OFFICE O/P EST LOW 20 MIN: CPT | Performed by: FAMILY MEDICINE

## 2020-10-07 NOTE — PROGRESS NOTES
Telephone Check-In    Lety Alba verbally consents to a Virtual/Telephone Check-In service on 10/07/20. Patient understands and accepts financial responsibility for any deductible, co-insurance and/or co-pays associated with this service.     [place]

## 2020-10-07 NOTE — TELEPHONE ENCOUNTER
Future Appointments   Date Time Provider Brian Caldera   10/7/2020  2:15 PM Kaila Ramirez MD [facility] [address] [facility]     Patient verbally consents to a virtual/telephone check-in service for the date and time noted above.  Patient understands and accepts

## 2020-10-07 NOTE — PATIENT INSTRUCTIONS
Thank you for choosing Danny Mazariegos MD at [place] [address]  To Do: Nichelle Jane  1. Coronavirus Disease 2019 (COVID-19)     [place] [address] is committed to the safety and well-being of our patients, members, employees, and communities.  As 2. Monitor your symptoms carefully. If your symptoms get worse, call your healthcare provider immediately. 3. Get rest and stay hydrated.    4. If you have a medical appointment, call the healthcare provider ahead of time and tell them that you have or may ? At least 24 hours have passed since recovery defined as resolution of fever without the use of fever-reducing medications; and  · Improvement in respiratory symptoms (e.g., cough, shortness of breath); and  · At least 10 days have passed since symptoms f If you would be interested in donating your plasma to help treat others diagnosed with the virus, please contact [facility] directly on their website: [email or web address]    Who is eligible to donate convalescent plasma? • You can NOW use [employer] to book your appointments with us, or consider using open access scheduling which is through the [employer]ward website [email or web address] and type in Cady Avila MD and follow the links for \"Schedule Online Now\"    •To sc Please allow our office 5 business days to contact you regarding any testing results. Refill policies:   Allow 3 business days for refills; controlled substances may take longer and must be picked up from the office in person.   Narcotic medications can

## 2020-10-08 ENCOUNTER — APPOINTMENT (OUTPATIENT)
Dept: LAB | Age: 40
End: 2020-10-08
Attending: FAMILY MEDICINE
Payer: COMMERCIAL

## 2020-10-08 DIAGNOSIS — Z20.822 CLOSE EXPOSURE TO COVID-19 VIRUS: ICD-10-CM

## 2021-04-09 DIAGNOSIS — Z23 NEED FOR VACCINATION: ICD-10-CM

## 2021-04-21 ENCOUNTER — PATIENT MESSAGE (OUTPATIENT)
Dept: FAMILY MEDICINE CLINIC | Facility: CLINIC | Age: 41
End: 2021-04-21

## 2021-04-21 NOTE — TELEPHONE ENCOUNTER
From: Chula Geller  To: Roberto Neighbours, DO  Sent: 4/21/2021 1:38 PM CDT  Subject: Other    Wanted to let you know that I have been vaccinated for Covid-19. Please see attached to update your records.      Nichelle Jane  [phone]

## 2021-09-10 ENCOUNTER — LAB ENCOUNTER (OUTPATIENT)
Dept: LAB | Age: 41
End: 2021-09-10
Attending: FAMILY MEDICINE
Payer: COMMERCIAL

## 2021-09-10 ENCOUNTER — OFFICE VISIT (OUTPATIENT)
Dept: FAMILY MEDICINE CLINIC | Facility: CLINIC | Age: 41
End: 2021-09-10
Payer: COMMERCIAL

## 2021-09-10 ENCOUNTER — HOSPITAL ENCOUNTER (OUTPATIENT)
Dept: MAMMOGRAPHY | Age: 41
Discharge: HOME | End: 2021-09-10
Attending: FAMILY MEDICINE
Payer: COMMERCIAL

## 2021-09-10 VITALS
HEIGHT: 65.75 IN | WEIGHT: 219 LBS | DIASTOLIC BLOOD PRESSURE: 78 MMHG | HEART RATE: 74 BPM | TEMPERATURE: 98 F | RESPIRATION RATE: 16 BRPM | OXYGEN SATURATION: 98 % | BODY MASS INDEX: 35.62 KG/M2 | SYSTOLIC BLOOD PRESSURE: 122 MMHG

## 2021-09-10 DIAGNOSIS — Z12.31 SCREENING MAMMOGRAM, ENCOUNTER FOR: ICD-10-CM

## 2021-09-10 DIAGNOSIS — Z00.00 WELL ADULT EXAM: ICD-10-CM

## 2021-09-10 DIAGNOSIS — N92.1 MENORRHAGIA WITH IRREGULAR CYCLE: ICD-10-CM

## 2021-09-10 DIAGNOSIS — Z00.00 WELL ADULT EXAM: Primary | ICD-10-CM

## 2021-09-10 DIAGNOSIS — E66.09 CLASS 2 OBESITY DUE TO EXCESS CALORIES WITHOUT SERIOUS COMORBIDITY WITH BODY MASS INDEX (BMI) OF 35.0 TO 35.9 IN ADULT: ICD-10-CM

## 2021-09-10 LAB
ALBUMIN SERPL-MCNC: 3.4 G/DL (ref 3.4–5)
ALBUMIN/GLOB SERPL: 0.9 {RATIO} (ref 1–2)
ALP LIVER SERPL-CCNC: 54 U/L
ALT SERPL-CCNC: 28 U/L
ANION GAP SERPL CALC-SCNC: 5 MMOL/L (ref 0–18)
AST SERPL-CCNC: 18 U/L (ref 15–37)
BASOPHILS # BLD AUTO: 0.06 X10(3) UL (ref 0–0.2)
BASOPHILS NFR BLD AUTO: 1.1 %
BILIRUB SERPL-MCNC: 0.4 MG/DL (ref 0.1–2)
BUN BLD-MCNC: 9 MG/DL (ref 7–18)
CALCIUM BLD-MCNC: 8.7 MG/DL (ref 8.5–10.1)
CHLORIDE SERPL-SCNC: 109 MMOL/L (ref 98–112)
CHOLEST SMN-MCNC: 167 MG/DL (ref ?–200)
CO2 SERPL-SCNC: 26 MMOL/L (ref 21–32)
CREAT BLD-MCNC: 0.7 MG/DL
EOSINOPHIL # BLD AUTO: 0.53 X10(3) UL (ref 0–0.7)
EOSINOPHIL NFR BLD AUTO: 9.5 %
ERYTHROCYTE [DISTWIDTH] IN BLOOD BY AUTOMATED COUNT: 12.9 %
GLOBULIN PLAS-MCNC: 3.9 G/DL (ref 2.8–4.4)
GLUCOSE BLD-MCNC: 77 MG/DL (ref 70–99)
HCT VFR BLD AUTO: 39.2 %
HDLC SERPL-MCNC: 52 MG/DL (ref 40–59)
HGB BLD-MCNC: 13.3 G/DL
IMM GRANULOCYTES # BLD AUTO: 0.02 X10(3) UL (ref 0–1)
IMM GRANULOCYTES NFR BLD: 0.4 %
LDLC SERPL CALC-MCNC: 99 MG/DL (ref ?–100)
LYMPHOCYTES # BLD AUTO: 2.1 X10(3) UL (ref 1–4)
LYMPHOCYTES NFR BLD AUTO: 37.8 %
M PROTEIN MFR SERPL ELPH: 7.3 G/DL (ref 6.4–8.2)
MCH RBC QN AUTO: 30.5 PG (ref 26–34)
MCHC RBC AUTO-ENTMCNC: 33.9 G/DL (ref 31–37)
MCV RBC AUTO: 89.9 FL
MONOCYTES # BLD AUTO: 0.52 X10(3) UL (ref 0.1–1)
MONOCYTES NFR BLD AUTO: 9.4 %
NEUTROPHILS # BLD AUTO: 2.33 X10 (3) UL (ref 1.5–7.7)
NEUTROPHILS # BLD AUTO: 2.33 X10(3) UL (ref 1.5–7.7)
NEUTROPHILS NFR BLD AUTO: 41.8 %
NONHDLC SERPL-MCNC: 115 MG/DL (ref ?–130)
OSMOLALITY SERPL CALC.SUM OF ELEC: 287 MOSM/KG (ref 275–295)
PATIENT FASTING Y/N/NP: YES
PATIENT FASTING Y/N/NP: YES
PLATELET # BLD AUTO: 248 10(3)UL (ref 150–450)
POTASSIUM SERPL-SCNC: 4.2 MMOL/L (ref 3.5–5.1)
RBC # BLD AUTO: 4.36 X10(6)UL
SODIUM SERPL-SCNC: 140 MMOL/L (ref 136–145)
T4 FREE SERPL-MCNC: 0.9 NG/DL (ref 0.8–1.7)
TRIGL SERPL-MCNC: 83 MG/DL (ref 30–149)
TSI SER-ACNC: 1.71 MIU/ML (ref 0.36–3.74)
VLDLC SERPL CALC-MCNC: 14 MG/DL (ref 0–30)
WBC # BLD AUTO: 5.6 X10(3) UL (ref 4–11)

## 2021-09-10 PROCEDURE — 84439 ASSAY OF FREE THYROXINE: CPT

## 2021-09-10 PROCEDURE — 77063 BREAST TOMOSYNTHESIS BI: CPT | Performed by: FAMILY MEDICINE

## 2021-09-10 PROCEDURE — 84443 ASSAY THYROID STIM HORMONE: CPT

## 2021-09-10 PROCEDURE — 99396 PREV VISIT EST AGE 40-64: CPT | Performed by: FAMILY MEDICINE

## 2021-09-10 PROCEDURE — 3078F DIAST BP <80 MM HG: CPT | Performed by: FAMILY MEDICINE

## 2021-09-10 PROCEDURE — 80061 LIPID PANEL: CPT

## 2021-09-10 PROCEDURE — 85025 COMPLETE CBC W/AUTO DIFF WBC: CPT

## 2021-09-10 PROCEDURE — 3008F BODY MASS INDEX DOCD: CPT | Performed by: FAMILY MEDICINE

## 2021-09-10 PROCEDURE — 77067 SCR MAMMO BI INCL CAD: CPT | Performed by: FAMILY MEDICINE

## 2021-09-10 PROCEDURE — 3074F SYST BP LT 130 MM HG: CPT | Performed by: FAMILY MEDICINE

## 2021-09-10 PROCEDURE — 80053 COMPREHEN METABOLIC PANEL: CPT

## 2021-09-10 PROCEDURE — 36415 COLL VENOUS BLD VENIPUNCTURE: CPT

## 2021-09-10 NOTE — PROGRESS NOTES
Patient presents with:  Physical: Routine annual physical with fasting labs and mammogram due. HPI:     Patient is here for a physical.      Heavy menses: she has heavy menses history. She is on paraguard for birth control purpose.  She has been on it History:   Diagnosis Date   • Allergy     allergy to dog dander/bee stings   • Allergy to dog dander    • Bee sting allergy    • History of pregnancy , , 2010, 2012-SAB; -17 hr labor , Pardeep Hidden (MLM); 2010-6 hr labor NSV edema and no tenderness. Lymphadenopathy: No cervical adenopathy. Neurological: Normal reflexes. No cranial nerve deficit or sensory deficit. Normal muscle tone. Coordination normal.   Skin: Skin is warm and dry. No rash noted.  No erythema   Psychiatri adult  Menorrhagia with irregular cycle       1. Well adult exam  - -Discussed weight loss methods including diet and exercise, counseled on safe sex, std risks, vaccine and screening guidelines. - CBC WITH DIFFERENTIAL WITH PLATELET;  Future  - COMP META

## 2021-09-10 NOTE — PATIENT INSTRUCTIONS
Prevention Guidelines, Women Ages 36 to 52  Screening tests and vaccines are an important part of managing your health. A screening test is done to find diseases in people who don't have any symptoms.  The goal is to find a disease early so lifestyle copeland sigmoidoscopy every 5 years, or  · Colonoscopy every 10 years, or  · CT colonography (virtual colonoscopy) every 5 years, or  · Yearly fecal occult blood test, or  · Yearly fecal immunochemical test every year, or  · Stool DNA test, every 3 years  If you c least 4 weeks after the first dose   Hepatitis A Women at increased risk for infection–talk with your healthcare provider 2 doses given 6 months apart   Hepatitis B Women at increased risk for infection–talk with your healthcare provider 3 doses over 6 mon American Academy of Ophthalmology  Дмитрий last reviewed this educational content on 11/1/2017  © 2767-3218 [employer] [address]. All rights reserved. This information is not intended as a substitute for professional medical care.  Always follow your

## 2021-09-22 ENCOUNTER — OFFICE VISIT (OUTPATIENT)
Dept: OBGYN CLINIC | Facility: CLINIC | Age: 41
End: 2021-09-22
Payer: COMMERCIAL

## 2021-09-22 VITALS
BODY MASS INDEX: 35.33 KG/M2 | WEIGHT: 217.19 LBS | SYSTOLIC BLOOD PRESSURE: 124 MMHG | DIASTOLIC BLOOD PRESSURE: 76 MMHG | HEART RATE: 73 BPM | HEIGHT: 65.75 IN

## 2021-09-22 DIAGNOSIS — Z01.419 WELL WOMAN EXAM WITH ROUTINE GYNECOLOGICAL EXAM: Primary | ICD-10-CM

## 2021-09-22 DIAGNOSIS — Z97.5 IUD (INTRAUTERINE DEVICE) IN PLACE: ICD-10-CM

## 2021-09-22 PROCEDURE — 99396 PREV VISIT EST AGE 40-64: CPT | Performed by: OBSTETRICS & GYNECOLOGY

## 2021-09-22 PROCEDURE — 3008F BODY MASS INDEX DOCD: CPT | Performed by: OBSTETRICS & GYNECOLOGY

## 2021-09-22 PROCEDURE — 3074F SYST BP LT 130 MM HG: CPT | Performed by: OBSTETRICS & GYNECOLOGY

## 2021-09-22 PROCEDURE — 3078F DIAST BP <80 MM HG: CPT | Performed by: OBSTETRICS & GYNECOLOGY

## 2021-09-22 NOTE — PROGRESS NOTES
OB/GYN H&P       CC: Patient presents with:  Physical: also would like to discuss other IUD options       HPI: Audrey Fischer is a 37year old [id number] here for well woman exam.  Doing well. No new medical problems.   She had [address] done back in June 2020, a Grandmother    • Heart Disease Paternal Grandfather    • Heart Attack Paternal Grandfather      Social History    Tobacco Use      Smoking status: Never Smoker      Smokeless tobacco: Never Used    Vaping Use      Vaping Use: Never used    Alcohol use: Yes Other Visit Diagnoses     Well woman exam with routine gynecological exam    -  Primary          Reviewed birth control options. ParaGard has been working well for her. Will  next year. We can remove and replace the IUD next year.     Ziyad Pablo,

## 2021-09-23 ENCOUNTER — IMMUNIZATION (OUTPATIENT)
Dept: FAMILY MEDICINE CLINIC | Facility: CLINIC | Age: 41
End: 2021-09-23
Payer: COMMERCIAL

## 2021-09-23 DIAGNOSIS — Z23 NEED FOR VACCINATION: Primary | ICD-10-CM

## 2021-09-23 PROCEDURE — 90686 IIV4 VACC NO PRSV 0.5 ML IM: CPT | Performed by: FAMILY MEDICINE

## 2021-09-23 PROCEDURE — 90471 IMMUNIZATION ADMIN: CPT | Performed by: FAMILY MEDICINE

## 2021-10-18 ENCOUNTER — OFFICE VISIT (OUTPATIENT)
Dept: INTERNAL MEDICINE CLINIC | Facility: CLINIC | Age: 41
End: 2021-10-18
Payer: COMMERCIAL

## 2021-10-18 VITALS
DIASTOLIC BLOOD PRESSURE: 82 MMHG | SYSTOLIC BLOOD PRESSURE: 120 MMHG | HEART RATE: 76 BPM | RESPIRATION RATE: 16 BRPM | HEIGHT: 66 IN | WEIGHT: 217 LBS | BODY MASS INDEX: 34.87 KG/M2

## 2021-10-18 DIAGNOSIS — Z51.81 THERAPEUTIC DRUG MONITORING: Primary | ICD-10-CM

## 2021-10-18 DIAGNOSIS — R94.31 ABNORMAL EKG: ICD-10-CM

## 2021-10-18 DIAGNOSIS — E66.9 OBESITY WITH BODY MASS INDEX (BMI) OF 35.0 TO 39.9 WITHOUT COMORBIDITY: ICD-10-CM

## 2021-10-18 PROCEDURE — 3008F BODY MASS INDEX DOCD: CPT | Performed by: NURSE PRACTITIONER

## 2021-10-18 PROCEDURE — 99204 OFFICE O/P NEW MOD 45 MIN: CPT | Performed by: NURSE PRACTITIONER

## 2021-10-18 PROCEDURE — 3074F SYST BP LT 130 MM HG: CPT | Performed by: NURSE PRACTITIONER

## 2021-10-18 PROCEDURE — 93000 ELECTROCARDIOGRAM COMPLETE: CPT | Performed by: NURSE PRACTITIONER

## 2021-10-18 PROCEDURE — 3079F DIAST BP 80-89 MM HG: CPT | Performed by: NURSE PRACTITIONER

## 2021-10-18 NOTE — PATIENT INSTRUCTIONS
We are here to support you with weight loss, but please remember that you still need your primary care provider for your routine health maintenance.       PLAN:  Will get stress echo done  Will get blood work done  [employer]  Follow up with me in day)                   ** Daily INPUT> Look at nutrition section-- \"nutrients\" and it will break down your macros for the day (ie. Protein, carbs, fibers, sugars and fats). Try to stay within these numbers daily     2.  \"7 minute workout\" to help with e

## 2021-10-18 NOTE — PROGRESS NOTES
HISTORY OF PRESENT ILLNESS  Patient presents with:  Weight Problem: referred by pcp,    Wale Retana is a 37year old female new to our office today for initiation of medical weight loss program.  Patient presents today with c/o excess weight.  Referred level: 10/10  Sleep hours and integrity: 5-6 Hours per night    MEDICAL HISTORY  PMH reviewed:   Cardiac disorders:negative   Depression/anxiety: negative  Glaucoma: negative  Kidney stones: negative  Eating disorder: negative  Migraines/seizures: negative soft, no masses, HSM or tenderness  EXTREMITIES: no cyanosis, no clubbing, no edema  NEURO: Oriented times three, full ROM of bilateral UE/LE  PSYCH: pleasant, cooperative, normal mood and affect    Lab Results   Component Value Date    GLU 77 09/10/2021 and adverse effects of this medication  · Contradictions: needs to get stress echo before stimulant  · Body composition test results given   · Reviewed labs, baseline labs and stress ECHO ordered  · Continue with reducing eating out  · Decrease carbs, incr

## 2021-10-26 ENCOUNTER — LAB ENCOUNTER (OUTPATIENT)
Dept: LAB | Age: 41
End: 2021-10-26
Attending: NURSE PRACTITIONER
Payer: COMMERCIAL

## 2021-10-26 DIAGNOSIS — E66.9 OBESITY WITH BODY MASS INDEX (BMI) OF 35.0 TO 39.9 WITHOUT COMORBIDITY: ICD-10-CM

## 2021-10-26 DIAGNOSIS — Z51.81 THERAPEUTIC DRUG MONITORING: ICD-10-CM

## 2021-10-26 DIAGNOSIS — R94.31 ABNORMAL EKG: ICD-10-CM

## 2021-10-29 ENCOUNTER — HOSPITAL ENCOUNTER (OUTPATIENT)
Dept: CV DIAGNOSTICS | Age: 41
Discharge: HOME | End: 2021-10-29
Attending: NURSE PRACTITIONER
Payer: COMMERCIAL

## 2021-10-29 ENCOUNTER — LAB ENCOUNTER (OUTPATIENT)
Dept: LAB | Age: 41
End: 2021-10-29
Attending: NURSE PRACTITIONER
Payer: COMMERCIAL

## 2021-10-29 DIAGNOSIS — Z51.81 THERAPEUTIC DRUG MONITORING: ICD-10-CM

## 2021-10-29 DIAGNOSIS — E66.9 OBESITY WITH BODY MASS INDEX (BMI) OF 35.0 TO 39.9 WITHOUT COMORBIDITY: ICD-10-CM

## 2021-10-29 DIAGNOSIS — R94.31 ABNORMAL EKG: ICD-10-CM

## 2021-10-29 PROCEDURE — 93350 STRESS TTE ONLY: CPT | Performed by: NURSE PRACTITIONER

## 2021-10-29 PROCEDURE — 36415 COLL VENOUS BLD VENIPUNCTURE: CPT

## 2021-10-29 PROCEDURE — 82607 VITAMIN B-12: CPT

## 2021-10-29 PROCEDURE — 83036 HEMOGLOBIN GLYCOSYLATED A1C: CPT

## 2021-10-29 PROCEDURE — 82306 VITAMIN D 25 HYDROXY: CPT

## 2021-10-29 PROCEDURE — 93017 CV STRESS TEST TRACING ONLY: CPT | Performed by: NURSE PRACTITIONER

## 2021-10-29 PROCEDURE — 93018 CV STRESS TEST I&R ONLY: CPT | Performed by: NURSE PRACTITIONER

## 2021-11-01 NOTE — PROGRESS NOTES
elevated blood sugar (a1c) it is in the prediabetes range 5.8%- I recommend avoiding carbs, exercise, and possibly starting a medication, which will help with weight loss, prediabetes.   Low Vitamin d:  please start taking ergocalciferol 50,000 U q week 5 w

## 2022-09-23 ENCOUNTER — OFFICE VISIT (OUTPATIENT)
Dept: FAMILY MEDICINE CLINIC | Facility: CLINIC | Age: 42
End: 2022-09-23

## 2022-09-23 VITALS
BODY MASS INDEX: 36.48 KG/M2 | TEMPERATURE: 98 F | SYSTOLIC BLOOD PRESSURE: 122 MMHG | HEART RATE: 68 BPM | OXYGEN SATURATION: 98 % | WEIGHT: 227 LBS | DIASTOLIC BLOOD PRESSURE: 70 MMHG | HEIGHT: 66 IN | RESPIRATION RATE: 16 BRPM

## 2022-09-23 DIAGNOSIS — Z11.3 SCREENING FOR STD (SEXUALLY TRANSMITTED DISEASE): ICD-10-CM

## 2022-09-23 DIAGNOSIS — Z00.00 WELL ADULT EXAM: ICD-10-CM

## 2022-09-23 DIAGNOSIS — Z01.419 WELL WOMAN EXAM WITH ROUTINE GYNECOLOGICAL EXAM: Primary | ICD-10-CM

## 2022-09-23 DIAGNOSIS — Z12.4 PAP SMEAR FOR CERVICAL CANCER SCREENING: ICD-10-CM

## 2022-09-23 DIAGNOSIS — Z12.31 ENCOUNTER FOR SCREENING MAMMOGRAM FOR MALIGNANT NEOPLASM OF BREAST: ICD-10-CM

## 2022-09-23 DIAGNOSIS — Z00.00 LABORATORY EXAMINATION ORDERED AS PART OF A ROUTINE GENERAL MEDICAL EXAMINATION: ICD-10-CM

## 2022-09-23 PROBLEM — N95.9 PREMENOPAUSAL PATIENT: Status: ACTIVE | Noted: 2022-09-23

## 2022-09-23 PROCEDURE — 3008F BODY MASS INDEX DOCD: CPT | Performed by: FAMILY MEDICINE

## 2022-09-23 PROCEDURE — 87491 CHLMYD TRACH DNA AMP PROBE: CPT | Performed by: FAMILY MEDICINE

## 2022-09-23 PROCEDURE — 87624 HPV HI-RISK TYP POOLED RSLT: CPT | Performed by: FAMILY MEDICINE

## 2022-09-23 PROCEDURE — 87591 N.GONORRHOEAE DNA AMP PROB: CPT | Performed by: FAMILY MEDICINE

## 2022-09-23 PROCEDURE — 99396 PREV VISIT EST AGE 40-64: CPT | Performed by: FAMILY MEDICINE

## 2022-09-23 PROCEDURE — 3078F DIAST BP <80 MM HG: CPT | Performed by: FAMILY MEDICINE

## 2022-09-23 PROCEDURE — 3074F SYST BP LT 130 MM HG: CPT | Performed by: FAMILY MEDICINE

## 2022-09-26 ENCOUNTER — LAB ENCOUNTER (OUTPATIENT)
Dept: LAB | Age: 42
End: 2022-09-26
Attending: FAMILY MEDICINE

## 2022-09-26 DIAGNOSIS — Z01.419 WELL WOMAN EXAM WITH ROUTINE GYNECOLOGICAL EXAM: ICD-10-CM

## 2022-09-26 DIAGNOSIS — Z00.00 LABORATORY EXAMINATION ORDERED AS PART OF A ROUTINE GENERAL MEDICAL EXAMINATION: ICD-10-CM

## 2022-09-26 LAB
ALBUMIN SERPL-MCNC: 3.8 G/DL (ref 3.4–5)
ALBUMIN/GLOB SERPL: 1 {RATIO} (ref 1–2)
ALP LIVER SERPL-CCNC: 57 U/L
ALT SERPL-CCNC: 33 U/L
ANION GAP SERPL CALC-SCNC: 2 MMOL/L (ref 0–18)
AST SERPL-CCNC: 22 U/L (ref 15–37)
BASOPHILS # BLD AUTO: 0.06 X10(3) UL (ref 0–0.2)
BASOPHILS NFR BLD AUTO: 1 %
BILIRUB SERPL-MCNC: 0.4 MG/DL (ref 0.1–2)
BUN BLD-MCNC: 15 MG/DL (ref 7–18)
C TRACH DNA SPEC QL NAA+PROBE: NEGATIVE
CALCIUM BLD-MCNC: 9.3 MG/DL (ref 8.5–10.1)
CHLORIDE SERPL-SCNC: 108 MMOL/L (ref 98–112)
CHOLEST SERPL-MCNC: 145 MG/DL (ref ?–200)
CO2 SERPL-SCNC: 29 MMOL/L (ref 21–32)
CREAT BLD-MCNC: 0.96 MG/DL
EOSINOPHIL # BLD AUTO: 0.51 X10(3) UL (ref 0–0.7)
EOSINOPHIL NFR BLD AUTO: 8.5 %
ERYTHROCYTE [DISTWIDTH] IN BLOOD BY AUTOMATED COUNT: 12.7 %
EST. AVERAGE GLUCOSE BLD GHB EST-MCNC: 117 MG/DL (ref 68–126)
FASTING PATIENT LIPID ANSWER: YES
FASTING STATUS PATIENT QL REPORTED: YES
GFR SERPLBLD BASED ON 1.73 SQ M-ARVRAT: 76 ML/MIN/1.73M2 (ref 60–?)
GLOBULIN PLAS-MCNC: 3.7 G/DL (ref 2.8–4.4)
GLUCOSE BLD-MCNC: 99 MG/DL (ref 70–99)
HBA1C MFR BLD: 5.7 % (ref ?–5.7)
HCT VFR BLD AUTO: 42.3 %
HDLC SERPL-MCNC: 48 MG/DL (ref 40–59)
HGB BLD-MCNC: 13.7 G/DL
HPV I/H RISK 1 DNA SPEC QL NAA+PROBE: NEGATIVE
IMM GRANULOCYTES # BLD AUTO: 0.01 X10(3) UL (ref 0–1)
IMM GRANULOCYTES NFR BLD: 0.2 %
LDLC SERPL CALC-MCNC: 80 MG/DL (ref ?–100)
LYMPHOCYTES # BLD AUTO: 2.32 X10(3) UL (ref 1–4)
LYMPHOCYTES NFR BLD AUTO: 38.7 %
MCH RBC QN AUTO: 30 PG (ref 26–34)
MCHC RBC AUTO-ENTMCNC: 32.4 G/DL (ref 31–37)
MCV RBC AUTO: 92.6 FL
MONOCYTES # BLD AUTO: 0.6 X10(3) UL (ref 0.1–1)
MONOCYTES NFR BLD AUTO: 10 %
N GONORRHOEA DNA SPEC QL NAA+PROBE: NEGATIVE
NEUTROPHILS # BLD AUTO: 2.5 X10 (3) UL (ref 1.5–7.7)
NEUTROPHILS # BLD AUTO: 2.5 X10(3) UL (ref 1.5–7.7)
NEUTROPHILS NFR BLD AUTO: 41.6 %
NONHDLC SERPL-MCNC: 97 MG/DL (ref ?–130)
OSMOLALITY SERPL CALC.SUM OF ELEC: 289 MOSM/KG (ref 275–295)
PLATELET # BLD AUTO: 240 10(3)UL (ref 150–450)
POTASSIUM SERPL-SCNC: 4.7 MMOL/L (ref 3.5–5.1)
PROT SERPL-MCNC: 7.5 G/DL (ref 6.4–8.2)
RBC # BLD AUTO: 4.57 X10(6)UL
SODIUM SERPL-SCNC: 139 MMOL/L (ref 136–145)
T4 FREE SERPL-MCNC: 1 NG/DL (ref 0.8–1.7)
TRIGL SERPL-MCNC: 89 MG/DL (ref 30–149)
TSI SER-ACNC: 2.96 MIU/ML (ref 0.36–3.74)
VLDLC SERPL CALC-MCNC: 14 MG/DL (ref 0–30)
WBC # BLD AUTO: 6 X10(3) UL (ref 4–11)

## 2022-09-26 PROCEDURE — 36415 COLL VENOUS BLD VENIPUNCTURE: CPT

## 2022-09-26 PROCEDURE — 80061 LIPID PANEL: CPT

## 2022-09-26 PROCEDURE — 83036 HEMOGLOBIN GLYCOSYLATED A1C: CPT

## 2022-09-26 PROCEDURE — 84443 ASSAY THYROID STIM HORMONE: CPT

## 2022-09-26 PROCEDURE — 84439 ASSAY OF FREE THYROXINE: CPT

## 2022-09-26 PROCEDURE — 80053 COMPREHEN METABOLIC PANEL: CPT

## 2022-09-26 PROCEDURE — 85025 COMPLETE CBC W/AUTO DIFF WBC: CPT

## 2022-09-29 ENCOUNTER — HOSPITAL ENCOUNTER (OUTPATIENT)
Dept: MAMMOGRAPHY | Age: 42
Discharge: HOME | End: 2022-09-29
Attending: FAMILY MEDICINE
Payer: COMMERCIAL

## 2022-09-29 DIAGNOSIS — Z12.31 ENCOUNTER FOR SCREENING MAMMOGRAM FOR MALIGNANT NEOPLASM OF BREAST: ICD-10-CM

## 2022-09-29 PROCEDURE — 77063 BREAST TOMOSYNTHESIS BI: CPT | Performed by: FAMILY MEDICINE

## 2022-09-29 PROCEDURE — 77067 SCR MAMMO BI INCL CAD: CPT | Performed by: FAMILY MEDICINE

## 2022-10-13 ENCOUNTER — OFFICE VISIT (OUTPATIENT)
Dept: OBGYN CLINIC | Facility: CLINIC | Age: 42
End: 2022-10-13
Payer: COMMERCIAL

## 2022-10-13 VITALS
BODY MASS INDEX: 36.5 KG/M2 | DIASTOLIC BLOOD PRESSURE: 72 MMHG | WEIGHT: 227.13 LBS | HEART RATE: 77 BPM | HEIGHT: 66 IN | SYSTOLIC BLOOD PRESSURE: 102 MMHG

## 2022-10-13 DIAGNOSIS — Z30.433 ENCOUNTER FOR IUD REMOVAL AND REINSERTION: Primary | ICD-10-CM

## 2022-10-13 DIAGNOSIS — Z01.812 PRE-PROCEDURAL LABORATORY EXAMINATION: ICD-10-CM

## 2022-10-13 LAB
CONTROL LINE PRESENT WITH A CLEAR BACKGROUND (YES/NO): YES YES/NO
PREGNANCY TEST, URINE: NEGATIVE

## 2022-10-13 PROCEDURE — 81025 URINE PREGNANCY TEST: CPT | Performed by: NURSE PRACTITIONER

## 2022-10-13 PROCEDURE — 3074F SYST BP LT 130 MM HG: CPT | Performed by: NURSE PRACTITIONER

## 2022-10-13 PROCEDURE — 3078F DIAST BP <80 MM HG: CPT | Performed by: NURSE PRACTITIONER

## 2022-10-13 PROCEDURE — 58301 REMOVE INTRAUTERINE DEVICE: CPT | Performed by: NURSE PRACTITIONER

## 2022-10-13 PROCEDURE — 58300 INSERT INTRAUTERINE DEVICE: CPT | Performed by: NURSE PRACTITIONER

## 2022-10-13 PROCEDURE — 3008F BODY MASS INDEX DOCD: CPT | Performed by: NURSE PRACTITIONER

## 2022-10-13 RX ORDER — COPPER 313.4 MG/1
1 INTRAUTERINE DEVICE INTRAUTERINE ONCE
Status: COMPLETED | OUTPATIENT
Start: 2022-10-13 | End: 2022-10-13

## 2022-10-13 RX ADMIN — COPPER 1 DEVICE: 313.4 INTRAUTERINE DEVICE INTRAUTERINE at 11:14:00

## 2022-10-13 NOTE — PROGRESS NOTES
Subjective:  40year old [id number]   Patient presents with:  IUD: paragard removal and reinsertion    Patient here today for removal on Paragurd IUD (Placed 2012) and reinsertion  Is having irregular menses   Patient denies iodine allergy    Review of Systems:  Pertinent items are noted in the HPI. Objective:  /72   Pulse 77   Ht 66\"   Wt 227 lb 2 oz (103 kg)   LMP 08/27/2022     Physical Examination:  General appearance: Well dressed, well nourished in no apparent distress  Neurologic/Psychiatric: Alert and oriented to person, place and time, mood normal, affect appropriate  Pelvic:    External genitalia- Normal, Bartholin's, urethra, skeins glands normal   Vagina- No vaginal lesions, physiologic discharge   Cervix- No lesions, long/closed, no cervical motion tenderness    Assessment/Plan:    Diagnoses and all orders for this visit:    Encounter for IUD removal and reinsertion  - see procedure note      - REMOVE INTRAUTERINE DEVICE  -     INSERT INTRAUTERINE DEVICE    Pre-procedural laboratory examination  -     URINE PREGNANCY TEST - negative      Return in about 4 weeks (around 11/10/2022) for IUD check.

## 2022-10-13 NOTE — PROGRESS NOTES
3/9/2017  Nichelle Jane  8/19/1980  29year old   female  Moraima Grimaldo MD    HPI:   Patient presents with:  Consult: bilateral wrist and hands -- RUE extremity is worse. Onset 2 wks and denies injury. No xrays.  Rates pain 6/10 in the AM. Right denise Ob/Gyn Interval Note     Per chart review patient had an elevated blood pressure on 10/4/2022 and with today's elevated blood pressures she now meets criteria for gestational hypertension. Had long discussion with patient, FOB, and midwife assistant in the room regarding recommendation for induction of labor for gestational hypertension greater than 37 weeks. Discussed at length all risks versus benefits of continuing pregnancy versus induction of labor. Discussed increased risk of preeclampsia with severe features, eclampsia, stroke, or maternal fetal death if patient were to leave and have severe range blood pressures at home. All of patient's and her family's questions were answered. After some time and deliberation patient agreed to stay for an induction of labor. Her preeclamptic labs are within normal limits P/C 0.11. She currently denies any signs or symptoms of preeclampsia. We will order admission labs and patient's prenatal labs. She currently is declining urine G/C or GBS testing. We will plan for SVE and LBUS and make plan for induction. Attending updated and in agreement with plan.     Vitals:    10/13/22 1625 10/13/22 1639 10/13/22 1758   BP: (!) 147/96 (!) 141/95 131/89   Pulse: 90 90 91   Resp: 16 17 18   Temp: 98.2 °F (36.8 °C)     TempSrc: Oral     SpO2: 99% 99%        Recent Results (from the past 6 hour(s))   CBC auto differential    Collection Time: 10/13/22  5:26 PM   Result Value Ref Range    WBC 7.5 3.5 - 11.3 k/uL    RBC 4.26 3.95 - 5.11 m/uL    Hemoglobin 12.7 11.9 - 15.1 g/dL    Hematocrit 36.4 36.3 - 47.1 %    MCV 85.4 82.6 - 102.9 fL    MCH 29.8 25.2 - 33.5 pg    MCHC 34.9 (H) 28.4 - 34.8 g/dL    RDW 13.6 11.8 - 14.4 %    Platelets 921 266 - 313 k/uL    MPV 11.8 8.1 - 13.5 fL    NRBC Automated 0.0 0.0 per 100 WBC    Seg Neutrophils 64 36 - 65 %    Lymphocytes 25 24 - 43 %    Monocytes 10 3 - 12 %    Eosinophils % 1 1 - 4 %    Basophils 0 0 - 2 %    Immature Granulocytes 0 0 %    Segs Absolute 4.74 1.50 - 8.10 k/uL    Absolute Lymph # 1.90 1.10 - 3.70 k/uL    Absolute Mono # 0.72 0.10 - 1.20 k/uL    Absolute Eos # 0.07 0.00 - 0.44 k/uL    Basophils Absolute <0.03 0.00 - 0.20 k/uL    Absolute Immature Granulocyte <0.03 0.00 - 0.30 k/uL   T. pallidum Ab    Collection Time: 10/13/22  5:26 PM   Result Value Ref Range    T. pallidum, IgG NONREACTIVE NONREACTIVE   Comprehensive Metabolic Panel    Collection Time: 10/13/22  5:26 PM   Result Value Ref Range    Glucose 92 70 - 99 mg/dL    BUN 10 6 - 20 mg/dL    Creatinine 0.53 0.50 - 0.90 mg/dL    Est, Glom Filt Rate >60 >60 mL/min/1.73m2    Calcium 9.2 8.6 - 10.4 mg/dL    Sodium 137 135 - 144 mmol/L    Potassium 3.9 3.7 - 5.3 mmol/L    Chloride 105 98 - 107 mmol/L    CO2 22 20 - 31 mmol/L    Anion Gap 10 9 - 17 mmol/L    Alkaline Phosphatase 117 (H) 35 - 104 U/L    ALT 17 5 - 33 U/L    AST 16 <32 U/L    Total Bilirubin 0.2 (L) 0.3 - 1.2 mg/dL    Total Protein 5.8 (L) 6.4 - 8.3 g/dL    Albumin 3.3 (L) 3.5 - 5.2 g/dL    Albumin/Globulin Ratio 1.3 1.0 - 2.5   TYPE AND SCREEN    Collection Time: 10/13/22  5:27 PM   Result Value Ref Range    Expiration Date 10/16/2022,2359     Arm Band Number [id number]     ABO/Rh O POSITIVE     Antibody Screen NEGATIVE    DRUG SCREEN MULTI URINE    Collection Time: 10/13/22  6:12 PM   Result Value Ref Range    Amphetamine Screen, Ur NEGATIVE NEGATIVE    Barbiturate Screen, Ur NEGATIVE NEGATIVE    Benzodiazepine Screen, Urine NEGATIVE NEGATIVE    Cocaine Metabolite, Urine NEGATIVE NEGATIVE    Methadone Screen, Urine NEGATIVE NEGATIVE    Opiates, Urine NEGATIVE NEGATIVE    Phencyclidine, Urine NEGATIVE NEGATIVE    Cannabinoid Scrn, Ur NEGATIVE NEGATIVE    Oxycodone Screen, Ur NEGATIVE NEGATIVE    Fentanyl, Ur NEGATIVE NEGATIVE    Test Information       Assay provides medical screening only.   The absence of expected drug(s) and/or metabolite(s) may indicate diluted or adulterated urine, limitations of testing or is awake and oriented x 3 and overall well appearing. The patient has normal mood. The patient is non-tender and atraumatic with the exception of their bilateral upper extremities  The patient's skin is intact and compartments are soft.   The patient's up index abduction, and the ECU tendon is stable in supination. The patient has no tenderness to palpation over the FCU insertion and negative pisiform grind.       ASSESSMENT AND PLAN:   Bilateral carpal tunnel syndrome  (primary encounter diagnosis)    The timing of collection.    Protein / creatinine ratio, urine    Collection Time: 10/13/22  6:12 PM   Result Value Ref Range    Total Protein, Urine 12 mg/dL    Creatinine, Ur 109.4 28.0 - 217.0 mg/dL    Urine Total Protein Creatinine Ratio 0.11 0.00 - 0.20       Stephanie Ashley  OB/GYN Resident  [facility]  10/13/2022 7:31 PM

## 2022-10-13 NOTE — PROCEDURES
IUD Removal/Insertion Procedure Note    Indications: Desires contraception    Procedure Details:   Urine pregnancy test negative. Checklist reviewed prior to insertion. No contraindications. The risks including infection, bleeding, expulsion, and uterine perforation with need for additional surgery and benefits of the procedure were explained to the patient and informed consent obtained. Sterile speculum inserted and IUD strings visualized. The cervix was prepped with betadine solution. Using ring forceps, the strings were grasped and the IUD removed intact without complications and without difficulty. The uterus was sounded to 8 cms. A new Paragard IUD was inserted without complications using correct insertion technique. The strings were trimmed to 2-3 cms. Signs and symptoms of complications reviewed and patient was given the patient information pamphlet. Condition:  Stable    Complications:  None    Plan:  The patient was advised to call for any fever or for prolonged or severe pain or bleeding. Follow up one month for IUD check.

## 2022-10-31 ENCOUNTER — OFFICE VISIT (OUTPATIENT)
Dept: INTERNAL MEDICINE CLINIC | Facility: CLINIC | Age: 42
End: 2022-10-31
Payer: COMMERCIAL

## 2022-10-31 VITALS
DIASTOLIC BLOOD PRESSURE: 68 MMHG | SYSTOLIC BLOOD PRESSURE: 104 MMHG | RESPIRATION RATE: 18 BRPM | OXYGEN SATURATION: 98 % | WEIGHT: 227.38 LBS | BODY MASS INDEX: 35.69 KG/M2 | HEIGHT: 67 IN | HEART RATE: 58 BPM

## 2022-10-31 DIAGNOSIS — Z51.81 THERAPEUTIC DRUG MONITORING: Primary | ICD-10-CM

## 2022-10-31 DIAGNOSIS — E66.9 OBESITY WITH BODY MASS INDEX (BMI) OF 35.0 TO 39.9 WITHOUT COMORBIDITY: ICD-10-CM

## 2022-10-31 DIAGNOSIS — R73.03 PREDIABETES: ICD-10-CM

## 2022-10-31 PROCEDURE — 3008F BODY MASS INDEX DOCD: CPT | Performed by: NURSE PRACTITIONER

## 2022-10-31 PROCEDURE — 99214 OFFICE O/P EST MOD 30 MIN: CPT | Performed by: NURSE PRACTITIONER

## 2022-10-31 PROCEDURE — 3074F SYST BP LT 130 MM HG: CPT | Performed by: NURSE PRACTITIONER

## 2022-10-31 PROCEDURE — 3078F DIAST BP <80 MM HG: CPT | Performed by: NURSE PRACTITIONER

## 2022-10-31 RX ORDER — PHENTERMINE HYDROCHLORIDE 15 MG/1
15 CAPSULE ORAL EVERY MORNING
Qty: 30 CAPSULE | Refills: 1 | Status: SHIPPED | OUTPATIENT
Start: 2022-10-31

## 2022-10-31 NOTE — PATIENT INSTRUCTIONS
Next steps:  1. Fill your prescribed medication and take as discussed and prescribed: phentermine 15mg   2. Schedule a personal nutrition consultation with one of our registered dieticians     Please try to work on the following dietary changes:  Daily protein recommendation to start: 80-90 grams  Daily carbohydrate: <130g  Daily calories: 1,500  1. Drink water with meals and throughout the day, cut down on soda and/or juice if consumed. Consider flavored water options like Bubbly, Spindrift, Hint and Anastacio. 2.  Eat breakfast daily and focus on having protein with each meal, examples include: greek yogurt, cottage cheese, hard boiled egg, whole grain toast with peanut butter. 3.  Reduce refined carbohydrates and sugars which includes items such as sweets, as well as rice, pasta, and bread and make sure to choose whole grain options when having them with just 1 serving per meal about the size of your inner palm. 4.  Consume non starchy veggies daily working towards making them a good 50% of your daily food intake. Add them to lunch and dinner consistently. 5.  Start a daily probiotic: VSL#3 is recommended, (order on line at www.vsl3. com). Take 1 capsule daily with water for 30 days, then reduce to 1 every other day (this will reduce the cost). Capsules can be left out for 2 weeks, but then must be refrigerated. Please download lise My Fitness Lawrence President! Or Net Diary to monitor daily dietary intake and you will be able to see if you are eating the right amount of calories or too much or too little which would hinder weight loss. Additionally this will help to see your daily carbohydrate and protein intake. When you set the lise up choose 1-2 lbs/week as a goal.  Keeping a paper food journal is an option as well to remain accountable for your choices- this is the start to mindful eating! A low calorie diet has been consistently shown to support weight loss.      Continue or start exercising to help establish a routine. If not already exercising begin with 1 day and progress as able with long-term goal of 30 minutes 5 days a week at a minimum. Meditation daily can help manage and control stress. Chronic stress can make weight loss difficult. Exercising is one way to help with stress, but meditation using the CALM Philip or another comparable alternative can be done in your home or place of work with little time commitment. This Philip can also help work on behavior change and improve sleep. Check out the segment under Calm Masterclass and listen to The 4 Pillars of Health. A great way to begin learning about the foundation of lifestyle with practical tips to use in your every day. Check out www.yourweightmatters. org blog for continued daily support and education along this weight loss journey! Patient Resources:     Personal Training/Fitness Classes/Health Coaching     [facility] and [place] @ [email or web address] Full fitness center with group fitness and personal training. Discount available as client of [facility]. Health Coaching and Personal Training with Yosef Goyal at our [facility]- individual weekly coaching with option to add personal training and small group fitness classes targeted at weight loss- [phone] and/or email @ [email or web address]  360FIT [place] [email or web address] Group Fitness [phone] and/or email Ashutosh Morgan at [email or web address]@"[email or web address], [email or web address]". com  [address] [facility] with multiple locations: Aetna (www.[place]. Backyard Brains), Eat The Frog Fitness (www.[address] FACC Fund I. Backyard Brains), Fit Body Bootcamp (www.Aductionsbodybootcamp.com), Bagels and Bean Fitness (www.Mojo Motors. Backyard Brains), The Exercise  (www.exercisecoach.Backyard Brains)     Online Fitness  Fitness  on Whole Foods in 10 DVD series- www. tywnt16QYB. Backyard Brains  Sit and Be Fit - Chair exercise series Www.sitandbefit. org  Hip Hop Fit with Gurdeep Grace at [place]     Apps for on the Employee Benefit Plans 7 Minute Workout (orange box with white 7) - free on the go HIIT training philip  Peloton Philip @ www. Centaur     Nutrition Trackers and Tools  LoseIT! And My Fitness Pal apps and on line for tracking nutrition  NOOM - virtual health coaching  FitFoundation (healthy meals on the go) in [facility] [employer] @ www. [email or web address]. Jp Reynaga MD @ www.Ploonge and Ervin Fermin (keto and low carb plans recommended) @ www. [email or web address], Metabolic Meals @ www. MyMetabolicMeals. com - individual prepared meals to go  Centaur, Kauli, [employer], Every Plate, Oink- on line meal delivery programs for preparation at home  [place] [employer] in [place] for homemade meals to go @ www.Quantock Brewery. BUSINESS INTELLIGENCE INTERNATIONAL  Diet Doctor @ www. dietdoctor. [employer] IN[employer] - low carb swaps  Yummly - meal prep and planning philip (www.yummly. com)     Stress Management/Behavior/Mindful Eating  CALM meditation philip (www.NextCare)  Headspace  Am I Hungry? Mindful eating virtual  philip  Www.yourweightmatters. org - Obesity Action Coalition sponsored Blog posts daily  Motivation philip (black box with white \")- daily supportive messages sent to your phone     Books/Video Education/Podcasts  Mindless Eating by Cyntiha Evans  Why We Get Sick by Oswald Danielson (a book about insulin resistance)  Atomic Habits by Marbella Hammond (a book about taking small steps to promote greater behavior change)   Can't Hurt Me by Isaac Romero (a book exploring the power of discipline in achieving your goals)  The End of Dieting: How to Live for Life by Dr. Sukhdev Solano M.D. or listen to The [address] Episode 61: Understanding \"Nutritarian\" Eating w/Dr. Sukhdev Solano  Your Body in Balance: The World [employer] of Food, Hormones, and Health by Dr. Cecilia Argueta  The Menopause Diet Plan by Maury Garcia and [facility]OS[facility] AT [facility]  The Complete Guide to fasting by Dr. Gabby Flores, [facility] by Jackie Rizo, Ph.D, R.D.   Weight Loss Surgery Will Not Treat Food Addiction by Imelda Ferreira Ph.D  The [address] [place] [address] on plant based nutrition  Fed Up - documentary about obesity (Free on [place])  The Truth About Sugar - documentary on sugar (Free on Utube, https://youOrgdotu. be/3E6tyqbNB5d)  The Dr. Latia Ramsey by Dr. Emerita Moore MD  Fitlosophy Fitspiration - journal to better health (found at [employer] in fitness aisle)  What Happened to You?- a look at the impact trauma has on behavior written by Ashley Perry and Dr. Bauman Ours Again by Marbella Goss - discovering your true self after trauma  Darlin Parra talk on NetDwellable, The Call to Courage  Podcasts: The Exam Room by the Physician's Committee, Nutrition Facts by Dr. Gretchen Marquis    We are here to support you with weight loss, but please remember that you still need your primary care provider for your routine health maintenance.

## 2022-11-17 ENCOUNTER — OFFICE VISIT (OUTPATIENT)
Dept: OBGYN CLINIC | Facility: CLINIC | Age: 42
End: 2022-11-17
Payer: COMMERCIAL

## 2022-11-17 VITALS
SYSTOLIC BLOOD PRESSURE: 102 MMHG | BODY MASS INDEX: 35.52 KG/M2 | WEIGHT: 221 LBS | HEIGHT: 66 IN | DIASTOLIC BLOOD PRESSURE: 64 MMHG | HEART RATE: 79 BPM

## 2022-11-17 DIAGNOSIS — Z30.431 IUD CHECK UP: Primary | ICD-10-CM

## 2022-11-17 PROCEDURE — 3008F BODY MASS INDEX DOCD: CPT | Performed by: NURSE PRACTITIONER

## 2022-11-17 PROCEDURE — 99212 OFFICE O/P EST SF 10 MIN: CPT | Performed by: NURSE PRACTITIONER

## 2022-11-17 PROCEDURE — 3074F SYST BP LT 130 MM HG: CPT | Performed by: NURSE PRACTITIONER

## 2022-11-17 PROCEDURE — 3078F DIAST BP <80 MM HG: CPT | Performed by: NURSE PRACTITIONER

## 2022-12-19 ENCOUNTER — OFFICE VISIT (OUTPATIENT)
Dept: INTERNAL MEDICINE CLINIC | Facility: CLINIC | Age: 42
End: 2022-12-19
Payer: COMMERCIAL

## 2022-12-19 VITALS
OXYGEN SATURATION: 99 % | HEART RATE: 79 BPM | HEIGHT: 67 IN | BODY MASS INDEX: 33.9 KG/M2 | DIASTOLIC BLOOD PRESSURE: 64 MMHG | SYSTOLIC BLOOD PRESSURE: 124 MMHG | RESPIRATION RATE: 16 BRPM | WEIGHT: 216 LBS

## 2022-12-19 DIAGNOSIS — E66.9 OBESITY WITH BODY MASS INDEX (BMI) OF 35.0 TO 39.9 WITHOUT COMORBIDITY: ICD-10-CM

## 2022-12-19 DIAGNOSIS — Z51.81 THERAPEUTIC DRUG MONITORING: Primary | ICD-10-CM

## 2022-12-19 DIAGNOSIS — R73.03 PREDIABETES: ICD-10-CM

## 2022-12-19 PROCEDURE — 99213 OFFICE O/P EST LOW 20 MIN: CPT | Performed by: NURSE PRACTITIONER

## 2022-12-19 PROCEDURE — 3008F BODY MASS INDEX DOCD: CPT | Performed by: NURSE PRACTITIONER

## 2022-12-19 PROCEDURE — 3074F SYST BP LT 130 MM HG: CPT | Performed by: NURSE PRACTITIONER

## 2022-12-19 PROCEDURE — 3078F DIAST BP <80 MM HG: CPT | Performed by: NURSE PRACTITIONER

## 2022-12-19 RX ORDER — PHENTERMINE HYDROCHLORIDE 37.5 MG/1
37.5 TABLET ORAL
Qty: 30 TABLET | Refills: 1 | Status: SHIPPED | OUTPATIENT
Start: 2022-12-19

## 2022-12-19 NOTE — PATIENT INSTRUCTIONS
Next steps:  1. Fill your prescribed medication and take as discussed and prescribed: phentermine   2. Schedule a personal nutrition consultation with one of our registered dieticians     Please try to work on the following dietary changes:    1. Drink water with meals and throughout the day, cut down on soda and/or juice if consumed. Consider flavored water options like Bubbly, Spindrift, Hint and Anastacio. 2.  Eat breakfast daily and focus on having protein with each meal, examples include: greek yogurt, cottage cheese, hard boiled egg, whole grain toast with peanut butter. 3.  Reduce refined carbohydrates and sugars which includes items such as sweets, as well as rice, pasta, and bread and make sure to choose whole grain options when having them with just 1 serving per meal about the size of your inner palm. 4.  Consume non starchy veggies daily working towards making them a good 50% of your daily food intake. Add them to lunch and dinner consistently. 5.  Start a daily probiotic: VSL#3 is recommended, (order on line at www.vsl3. com). Take 1 capsule daily with water for 30 days, then reduce to 1 every other day (this will reduce the cost). Capsules can be left out for 2 weeks, but then must be refrigerated. Please download lise My Fitness Arnulfofortino Barton! Or Net Diary to monitor daily dietary intake and you will be able to see if you are eating the right amount of calories or too much or too little which would hinder weight loss. Additionally this will help to see your daily carbohydrate and protein intake. When you set the lise up choose 1-2 lbs/week as a goal.  Keeping a paper food journal is an option as well to remain accountable for your choices- this is the start to mindful eating! A low calorie diet has been consistently shown to support weight loss. Continue or start exercising to help establish a routine.  If not already exercising begin with 1 day and progress as able with long-term goal of 30 minutes 5 days a week at a minimum. Meditation daily can help manage and control stress. Chronic stress can make weight loss difficult. Exercising is one way to help with stress, but meditation using the CALM Philip or another comparable alternative can be done in your home or place of work with little time commitment. This Philip can also help work on behavior change and improve sleep. Check out the segment under Calm Masterclass and listen to The 4 Pillars of Health. A great way to begin learning about the foundation of lifestyle with practical tips to use in your every day. Check out www.yourweightmatters. org blog for continued daily support and education along this weight loss journey! Patient Resources:     Personal Training/Fitness Classes/Health Coaching     Enrico Potts and Amador Sophfrenandomila @ [email or web address]/ Full fitness center with group fitness and personal training. Discount available as client of [facility]. Health Coaching and Personal Training with Jai Hood at our [facility]- individual weekly coaching with option to add personal training and small group fitness classes targeted at weight loss- [phone] and/or email @ [email or web address]  360FIT [place] [email or web address] Group Fitness [phone] and/or email Andrea Gray at [email or web address]  [address] [facility] with multiple locations: Aetna (www.Appsdaily Solutions), Eat The NinthDecimal Fitness (www.FedCyber. Carritus), Fit Body Bootcamp (www.Space Star Technologybodybootcamp.com), [employer] (www.Apani Networks. Carritus), The Exercise  (www.exercisecoach.Carritus)     Online Fitness  Fitness  on Whole Foods in 10 DVD series- www. uxcvu33PMU. Carritus  Sit and Be Fit - Chair exercise series Www.sitandbefit. org  Hip Hop Fit with Gurdeep Grace at www.hiphopfit. net     Apps for on the Kardium 7 Minute Workout (orange box with white 7) - free on the go HIIT training philip  Peloton Philip @ www. Citizenside     Nutrition Trackers and Tools  LoseIT! And My Fitness Pal apps and on line for tracking nutrition  NOOM - virtual health coaching  FitFoundation (healthy meals on the go) in [facility] @ www. ydhbktuujzxiw4f. Caroll Brunner MD @ www.Think Good ThoughtsdRoboCent and Tony Frazier (keto and low carb plans recommended) @ www. [email or web address].[email or web address], Metabolic Meals @ www. MyMetabolicMeals. com - individual prepared meals to go  RaySat, MoneyReef, International Business Machines, Every Plate, ARIO Data Networks- on line meal delivery programs for preparation at home  [place] in [place] for homemade meals to go @ www.mealZhou Heiya. Relevare Pharmaceuticals  Diet Doctor @ www. dietdoctor. Relevare [employer]aceuticals - low carb swaps  YummQobliQ Group - meal prep and planning philip (www.yummly. com)     Stress Management/Behavior/Mindful Eating  CALM meditation philip (www.BrightFarms)  Headspace  Am I Hungry? Mindful eating virtual  philip  Www.yourweightmatters. org - Obesity Action Coalition sponsored Blog posts daily  Motivation philip (black box with white \")- daily supportive messages sent to your phone     Books/Video Education/Podcasts  Mindless Eating by Steven Gamino  Why We Get Sick by Gabriela Can (a book about insulin resistance)  Atomic Habits by Vannessa Cervantes (a book about taking small steps to promote greater behavior change)   Can't Hurt Me by Joelle Askew (a book exploring the power of discipline in achieving your goals)  The End of Dieting: How to Live for Life by Dr. Danii Roger M.D. or listen to The [facility] Episode 61: Understanding \"Nutritarian\" Eating w/Dr. Danii Roger  Your Body in Balance: The World [employer] Corporation of Food, Hormones, and Health by Dr. Antonella Molina  The Menopause Diet Plan by Cristina Yang and [facility] HOSP AT [facility]  The Complete Guide to fasting by Dr. Paulo Garcia, [address] [facility] by Marilia Leon, Ph.D, R.D.   Weight Loss Surgery Will Not Treat Food Addiction by Kindra Bullock Ph.D  The [facility] [facility] on plant based nutrition  Fed Up - documentary about obesity (Free on Utube)  The Truth About Sugar - documentary on sugar (Free on Utube, https://youtu. be/7Z8txwdIC3q)  The Dr. Arlo Dakin by Dr. Roxanne Croft MD  Fitlosophy Fitspiration - journal to better health (found at [employer] in fitness aisle)  What Happened to You?- a look at the impact trauma has on behavior written by Laron Ibarra and Dr. Dania Diggs Again by Jennifer Navarrete - discovering your true self after trauma  Brenda Anderson talk on "SimplePons, Inc.", The Call to Courage  Podcasts: The Exam Room by the Physician's Committee, Nutrition Facts by Dr. Tank Alves    We are here to support you with weight loss, but please remember that you still need your primary care provider for your routine health maintenance.

## 2023-02-21 ENCOUNTER — OFFICE VISIT (OUTPATIENT)
Dept: INTERNAL MEDICINE CLINIC | Facility: CLINIC | Age: 43
End: 2023-02-21
Payer: COMMERCIAL

## 2023-02-21 VITALS
BODY MASS INDEX: 33.27 KG/M2 | RESPIRATION RATE: 16 BRPM | HEIGHT: 67 IN | SYSTOLIC BLOOD PRESSURE: 128 MMHG | OXYGEN SATURATION: 98 % | WEIGHT: 212 LBS | DIASTOLIC BLOOD PRESSURE: 60 MMHG | HEART RATE: 78 BPM

## 2023-02-21 DIAGNOSIS — Z51.81 THERAPEUTIC DRUG MONITORING: Primary | ICD-10-CM

## 2023-02-21 DIAGNOSIS — E66.9 OBESITY WITH BODY MASS INDEX (BMI) OF 35.0 TO 39.9 WITHOUT COMORBIDITY: ICD-10-CM

## 2023-02-21 DIAGNOSIS — E66.9 OBESITY (BMI 30-39.9): ICD-10-CM

## 2023-02-21 DIAGNOSIS — R73.03 PREDIABETES: ICD-10-CM

## 2023-02-21 PROCEDURE — 3078F DIAST BP <80 MM HG: CPT | Performed by: NURSE PRACTITIONER

## 2023-02-21 PROCEDURE — 3008F BODY MASS INDEX DOCD: CPT | Performed by: NURSE PRACTITIONER

## 2023-02-21 PROCEDURE — 3074F SYST BP LT 130 MM HG: CPT | Performed by: NURSE PRACTITIONER

## 2023-02-21 PROCEDURE — 99213 OFFICE O/P EST LOW 20 MIN: CPT | Performed by: NURSE PRACTITIONER

## 2023-02-21 RX ORDER — PHENTERMINE HYDROCHLORIDE 37.5 MG/1
37.5 TABLET ORAL
Qty: 30 TABLET | Refills: 1 | Status: SHIPPED | OUTPATIENT
Start: 2023-02-21

## 2023-02-21 NOTE — PATIENT INSTRUCTIONS
Next steps:  1. Fill your prescribed medication and take as discussed and prescribed: phentermine   2. Schedule a personal nutrition consultation with one of our registered dieticians     Please try to work on the following dietary changes:    1. Drink water with meals and throughout the day, cut down on soda and/or juice if consumed. Consider flavored water options like Bubbly, Spindrift, Hint and Anastacio. 2.  Eat breakfast daily and focus on having protein with each meal, examples include: greek yogurt, cottage cheese, hard boiled egg, whole grain toast with peanut butter. 3.  Reduce refined carbohydrates and sugars which includes items such as sweets, as well as rice, pasta, and bread and make sure to choose whole grain options when having them with just 1 serving per meal about the size of your inner palm. 4.  Consume non starchy veggies daily working towards making them a good 50% of your daily food intake. Add them to lunch and dinner consistently. 5.  Start a daily probiotic: VSL#3 is recommended, (order on line at www.vsl3. com). Take 1 capsule daily with water for 30 days, then reduce to 1 every other day (this will reduce the cost). Capsules can be left out for 2 weeks, but then must be refrigerated. Please download lise My Fitness [place]! Or Net Diary to monitor daily dietary intake and you will be able to see if you are eating the right amount of calories or too much or too little which would hinder weight loss. Additionally this will help to see your daily carbohydrate and protein intake. When you set the lise up choose 1-2 lbs/week as a goal.  Keeping a paper food journal is an option as well to remain accountable for your choices- this is the start to mindful eating! A low calorie diet has been consistently shown to support weight loss. Continue or start exercising to help establish a routine.  If not already exercising begin with 1 day and progress as able with long-term goal of 30 minutes 5 days a week at a minimum. Meditation daily can help manage and control stress. Chronic stress can make weight loss difficult. Exercising is one way to help with stress, but meditation using the CALM Philip or another comparable alternative can be done in your home or place of work with little time commitment. This Philip can also help work on behavior change and improve sleep. Check out the segment under Calm Masterclass and listen to The 4 Pillars of Health. A great way to begin learning about the foundation of lifestyle with practical tips to use in your every day. Check out www.yourweightmatters. org blog for continued daily support and education along this weight loss journey! Patient Resources:     Personal Training/Fitness Classes/Health Coaching     Enrico Potts and Amador Sophiaside @ [email or web address] Full fitness center with group fitness and personal training. Discount available as client of [facility]. Health Coaching and Personal Training with Will Nunez at our [facility]- individual weekly coaching with option to add personal training and small group fitness classes targeted at weight loss- [phone] and/or email @ Jamey Cogan. [email or web address]  360FIT [place] [email or web address] Group Fitness [phone] and/or email Kayce Brown at [email or web address]  [address] [facility] with multiple locations: Aetna (www.GeneTex), Eat The [employer] Fitness (www.PayUsLessRx.com. SnapDash), Fit Body Bootcamp (www.daPulsebodyboReciclatap.SnapDash), ThinkEco (www.Tradeasi Solutions), The Exercise  (www.exercisecoach.SnapDash)     Online Fitness  Fitness  on Whole Foods in 10 DVD series- www. sgemw80YKO. SnapDash  Sit and Be Fit - Chair exercise series Www.sitandbefit. org  Hip Hop Fit with Gurdeep Grace at www.hiphopfit. net     Apps for on the [employer] 7 Minute Workout (orange box with white 7) - free on the go HIIT training philip  Peloton Philip @ www. Qubit     Nutrition Trackers and Tools  LoseIT! And My Fitness Pal apps and on line for tracking nutrition  NOOM - virtual health coaching  FitFoundation (healthy meals on the go) in [facility] @ www. dmdjaeietixve7h. Kemar Law MD @ www.KingspokedReal Time Tomography and Juan Carlos Machadogy (keto and low carb plans recommended) @ www. YHRTOF65.VDT, Metabolic Meals @ www. UnioncyMetabolicMeals. com - individual prepared meals to go  NeoMedia Technologies, The Optima, International Business Machines, Every Plate, icomasoft- on line meal delivery programs for preparation at home  [facility] in [place] for homemade meals to go @ www.Seatwave. OwnerListens  Diet Doctor @ www. dietdoctor. OwnerListens - low carb swaps  Yuvirtual tweens ltd - meal prep and planning philip (www.yummly. com)     Stress Management/Behavior/Mindful Eating  CALM meditation philip (www.ShareMeister)  Headspace  Am I Hungry? Mindful eating virtual  philip  Www.yourweightmatters. org - Obesity Action Coalition sponsored Blog posts daily  Motivation philip (black box with white \")- daily supportive messages sent to your phone     Books/Video Education/Podcasts  Mindless Eating by Sol Jack  Why We Get Sick by Kyler Dobbins (a book about insulin resistance)  Atomic Habits by Mychal Lira (a book about taking small steps to promote greater behavior change)   Can't Hurt Me by Dione Rubio (a book exploring the power of discipline in achieving your goals)  The End of Dieting: How to Live for Life by Dr. Yany Hollingsworth M.D. or listen to The [facility] Episode 61: Understanding \"Nutritarian\" Eating w/Dr. Yany Hollingsworth  Your Body in Balance: The World [employer] Corporation of Food, Hormones, and Health by Dr. Elvira Hewitt  The Menopause Diet Plan by Neva Quinonez and [facility] AT [facility]  The Complete Guide to fasting by Dr. Sangeeta Sánchez, [address] [facility] by Elkin Coffey, Ph.D, R.D.   Weight Loss Surgery Will Not Treat Food Addiction by Daralene Cushing Ph.D  The [address] [facility] on plant based nutrition  Fed Up - documentary about obesity (Free on Utube)  The Truth About Sugar - documentary on sugar (Free on Utube, https://youtu. be/6O0qrxdXJ9s)  The Dr. Olaf Sorto by Dr. Laurel Grubbs MD  Fitlosophy Fitspiration - journal to better health (found at [employer] in fitness aisle)  What Happened to You?- a look at the impact trauma has on behavior written by Tammy Cardona and Dr. Bart Molina Again by [place] - discovering your true self after trauma  Mary Fair talk on ScaleOut Software, The Call to Courage  Podcasts: The Exam Room by the Physician's Committee, Nutrition Facts by Dr. Kajal Newell    We are here to support you with weight loss, but please remember that you still need your primary care provider for your routine health maintenance.

## 2023-05-15 ENCOUNTER — OFFICE VISIT (OUTPATIENT)
Dept: FAMILY MEDICINE CLINIC | Facility: CLINIC | Age: 43
End: 2023-05-15
Payer: COMMERCIAL

## 2023-05-15 ENCOUNTER — OFFICE VISIT (OUTPATIENT)
Dept: INTERNAL MEDICINE CLINIC | Facility: CLINIC | Age: 43
End: 2023-05-15
Payer: COMMERCIAL

## 2023-05-15 VITALS
WEIGHT: 216 LBS | HEIGHT: 67 IN | OXYGEN SATURATION: 99 % | BODY MASS INDEX: 33.9 KG/M2 | TEMPERATURE: 97 F | RESPIRATION RATE: 16 BRPM | HEART RATE: 56 BPM

## 2023-05-15 VITALS
HEART RATE: 100 BPM | DIASTOLIC BLOOD PRESSURE: 72 MMHG | BODY MASS INDEX: 33.27 KG/M2 | HEIGHT: 67 IN | RESPIRATION RATE: 16 BRPM | SYSTOLIC BLOOD PRESSURE: 124 MMHG | OXYGEN SATURATION: 91 % | WEIGHT: 212 LBS

## 2023-05-15 DIAGNOSIS — E66.9 OBESITY (BMI 30-39.9): ICD-10-CM

## 2023-05-15 DIAGNOSIS — H10.9 CONJUNCTIVITIS OF RIGHT EYE, UNSPECIFIED CONJUNCTIVITIS TYPE: Primary | ICD-10-CM

## 2023-05-15 DIAGNOSIS — R73.03 PREDIABETES: ICD-10-CM

## 2023-05-15 DIAGNOSIS — Z51.81 THERAPEUTIC DRUG MONITORING: Primary | ICD-10-CM

## 2023-05-15 PROCEDURE — 99214 OFFICE O/P EST MOD 30 MIN: CPT | Performed by: NURSE PRACTITIONER

## 2023-05-15 PROCEDURE — 3078F DIAST BP <80 MM HG: CPT | Performed by: NURSE PRACTITIONER

## 2023-05-15 PROCEDURE — 3074F SYST BP LT 130 MM HG: CPT | Performed by: NURSE PRACTITIONER

## 2023-05-15 PROCEDURE — 3008F BODY MASS INDEX DOCD: CPT | Performed by: NURSE PRACTITIONER

## 2023-05-15 RX ORDER — POLYMYXIN B SULFATE AND TRIMETHOPRIM 1; 10000 MG/ML; [USP'U]/ML
1 SOLUTION OPHTHALMIC EVERY 4 HOURS
Qty: 10 ML | Refills: 0 | Status: SHIPPED | OUTPATIENT
Start: 2023-05-15 | End: 2023-05-22

## 2023-05-15 RX ORDER — PHENTERMINE HYDROCHLORIDE 37.5 MG/1
37.5 TABLET ORAL
Qty: 30 TABLET | Refills: 2 | Status: SHIPPED | OUTPATIENT
Start: 2023-05-15

## 2023-05-15 NOTE — PATIENT INSTRUCTIONS
Next steps:  1. Fill your prescribed medication and take as discussed and prescribed: phentermine   2. Schedule a personal nutrition consultation with one of our registered dieticians  3. Check with insurance on coverage for GLP-1 medications (ie. Wegovy or saxenda)     1. Drink water with meals and throughout the day, cut down on soda and/or juice if consumed. Consider flavored water options like Bubbly, Spindrift, Hint and Anastacio. 2.  Eat breakfast daily and focus on having protein with each meal, examples include: greek yogurt, cottage cheese, hard boiled egg, whole grain toast with peanut butter. 3.  Reduce refined carbohydrates and sugars which includes items such as sweets, as well as rice, pasta, and bread and make sure to choose whole grain options when having them with just 1 serving per meal about the size of your inner palm. 4.  Consume non starchy veggies daily working towards making them a good 50% of your daily food intake. Add them to lunch and dinner consistently. 5.  Start a daily probiotic: VSL#3 is recommended, (order on line at www.vsl3. com). Take 1 capsule daily with water for 30 days, then reduce to 1 every other day (this will reduce the cost). Capsules can be left out for 2 weeks, but then must be refrigerated. Please download lise My Fitness  Nip! Or Net Diary to monitor daily dietary intake and you will be able to see if you are eating the right amount of calories or too much or too little which would hinder weight loss. Additionally this will help to see your daily carbohydrate and protein intake. When you set the lise up choose 1-2 lbs/week as a goal.  Keeping a paper food journal is an option as well to remain accountable for your choices- this is the start to mindful eating! A low calorie diet has been consistently shown to support weight loss. Continue or start exercising to help establish a routine.  If not already exercising begin with 1 day and progress as able with long-term goal of 30 minutes 5 days a week at a minimum. Meditation daily can help manage and control stress. Chronic stress can make weight loss difficult. Exercising is one way to help with stress, but meditation using the CALM Philip or another comparable alternative can be done in your home or place of work with little time commitment. This Philip can also help work on behavior change and improve sleep. Check out the segment under Calm Masterclass and listen to The 4 Pillars of Health. A great way to begin learning about the foundation of lifestyle with practical tips to use in your every day. Check out www.yourweightmatters. org blog for continued daily support and education along this weight loss journey! Patient Resources:     Personal Training/Fitness Classes/Health Coaching     Enrico 112 and Amador Sophiaside @ [email or web address] Full fitness center with group fitness and personal training. Discount available as client of [facility]. Health Coaching and Personal Training with No Gunn at our [employer]- individual weekly coaching with option to add personal training and small group fitness classes targeted at weight loss- [phone] and/or email @ Caitlin Agarwal. [email or web address]  360FIT [place] [email or web address] Group Fitness [phone] and/or email Ezequiel Chinchilla at [email or web address]. Boundless Network  [address] [facility] with multiple locations: Aetna (www.GreatPoint Energy. Boundless Network), Eat The Valyoo Technologies Fitness (www.Austhink Software. Boundless Network), Fit Body Bootcamp (www.Eternity Medicine Institutebodybootcamp.Boundless Network), Comprimato Fitness (www.FiPath. Boundless Network), The Exercise  (www.exercisecoach.Boundless Network)     Online Fitness  Fitness  on Whole Foods in 10 DVD series- www. geciu91KXG. Boundless Network  Sit and Be Fit - Chair exercise series Www.sitandbefit. org  Hip Hop Fit with Gurdeep Grace at www.hiphopfit. net     Apps for on the [employer] 7 Minute Workout (orange box with white 7) - free on the go HIIT training philip  Peloton Philip @ www. Idea Village     Nutrition Trackers and Tools  LoseIT! And My Fitness Pal apps and on line for tracking nutrition  NOOM - virtual health coaching  FitFoundation (healthy meals on the go) in [facility] @ www. vnthqfzeombiz8h. Kathrine Ashby MD @ www.Sim Ops StudiosdKontera and Imelda Pulido (keto and low carb plans recommended) @ www. mEgo.ELISE, Metabolic Meals @ www. MyMetabolicMeals. com - individual prepared meals to go  NonWoTecc Medical, Dealised, International Business Machines, Every Plate, iCeutica- on line meal delivery programs for preparation at home  AK [employer] in [place] for homemade meals to go @ wwwInstabug  Diet Doctor @ www. dietdoctor. com - low carb swaps  YuEmber Entertainment - meal prep and planning philip (www.yummly. com)     Stress Management/Behavior/Mindful Eating  CALM meditation philip (www.Adviously Inc.)  Headspace  Am I Hungry? Mindful eating virtual  philip  Www.yourweightmatters. org - Obesity Action Coalition sponsored Blog posts daily  Motivation philip (black box with white \")- daily supportive messages sent to your phone     Books/Video Education/Podcasts  Mindless Eating by Yuliya Arizmendi  Why We Get Sick by Deisy Zhong (a book about insulin resistance)  Atomic Habits by Jocelynn Merchant (a book about taking small steps to promote greater behavior change)   Can't Hurt Me by Amy Desir (a book exploring the power of discipline in achieving your goals)  The End of Dieting: How to Live for Life by Dr. Robi Centeno M.D. or listen to The [facility] Episode 61: Understanding \"Nutritarian\" Eating w/Dr. Robi Centeno  Your Body in Balance: The World [employer] Corporation of Food, Hormones, and Health by Dr. Cierra Gonzalez  The Menopause Diet Plan by Tushar Chamorro and [facility]OSP AT [facility]  The Complete Guide to fasting by Dr. Mandy Duarte, [address] [facility] by Anisha Adames, Ph.D, R.D.   Weight Loss Surgery Will Not Treat Food Addiction by Roslyn Quick Ph.D  The Game Changers- Netflix Documentary on plant based nutrition  Fed Up - documentary about obesity (Free on Utube)  The Truth About Sugar - documentary on sugar (Free on Utube, https://youtu. be/0N0exlkST7k)  The Dr. Shadi Terrazas by Dr. Mercedes Dennis MD  Fitlosophy Fitspiration - journal to better health (found at [employer] in fitness aisle)  What Happened to You?- a look at the impact trauma has on behavior written by Magda Amador and Dr. Angel Mercado Again by Elan Zabala - discovering your true self after trauma  Christel Diamond talk on Netflix, The Call to Courage  Podcasts: The Exam Room by the Physician's Committee, Nutrition Facts by Dr. Karla Tripp    We are here to support you with weight loss, but please remember that you still need your primary care provider for your routine health maintenance.

## 2023-11-17 ENCOUNTER — OFFICE VISIT (OUTPATIENT)
Dept: FAMILY MEDICINE CLINIC | Facility: CLINIC | Age: 43
End: 2023-11-17
Payer: COMMERCIAL

## 2023-11-17 VITALS
HEIGHT: 67 IN | OXYGEN SATURATION: 98 % | BODY MASS INDEX: 34.6 KG/M2 | DIASTOLIC BLOOD PRESSURE: 72 MMHG | HEART RATE: 83 BPM | TEMPERATURE: 98 F | RESPIRATION RATE: 16 BRPM | SYSTOLIC BLOOD PRESSURE: 104 MMHG | WEIGHT: 220.44 LBS

## 2023-11-17 DIAGNOSIS — Z00.00 WELL ADULT EXAM: Primary | ICD-10-CM

## 2023-11-17 DIAGNOSIS — Z23 NEED FOR TDAP VACCINATION: ICD-10-CM

## 2023-11-17 DIAGNOSIS — Z12.31 BREAST CANCER SCREENING BY MAMMOGRAM: ICD-10-CM

## 2023-11-17 DIAGNOSIS — R73.03 PREDIABETES: ICD-10-CM

## 2023-11-17 DIAGNOSIS — Z23 NEED FOR VACCINATION: ICD-10-CM

## 2023-11-17 PROCEDURE — 3078F DIAST BP <80 MM HG: CPT | Performed by: FAMILY MEDICINE

## 2023-11-17 PROCEDURE — 99396 PREV VISIT EST AGE 40-64: CPT | Performed by: FAMILY MEDICINE

## 2023-11-17 PROCEDURE — 90715 TDAP VACCINE 7 YRS/> IM: CPT | Performed by: FAMILY MEDICINE

## 2023-11-17 PROCEDURE — 3074F SYST BP LT 130 MM HG: CPT | Performed by: FAMILY MEDICINE

## 2023-11-17 PROCEDURE — 3008F BODY MASS INDEX DOCD: CPT | Performed by: FAMILY MEDICINE

## 2023-11-17 PROCEDURE — 90471 IMMUNIZATION ADMIN: CPT | Performed by: FAMILY MEDICINE

## 2023-12-15 ENCOUNTER — LAB ENCOUNTER (OUTPATIENT)
Dept: LAB | Age: 43
End: 2023-12-15
Attending: FAMILY MEDICINE
Payer: COMMERCIAL

## 2023-12-15 DIAGNOSIS — R73.03 PREDIABETES: ICD-10-CM

## 2023-12-15 DIAGNOSIS — Z00.00 WELL ADULT EXAM: ICD-10-CM

## 2023-12-15 LAB
ALBUMIN SERPL-MCNC: 3.6 G/DL (ref 3.4–5)
ALBUMIN/GLOB SERPL: 0.9 {RATIO} (ref 1–2)
ALP LIVER SERPL-CCNC: 55 U/L
ALT SERPL-CCNC: 28 U/L
ANION GAP SERPL CALC-SCNC: 4 MMOL/L (ref 0–18)
AST SERPL-CCNC: 18 U/L (ref 15–37)
BASOPHILS # BLD AUTO: 0.06 X10(3) UL (ref 0–0.2)
BASOPHILS NFR BLD AUTO: 1.2 %
BILIRUB SERPL-MCNC: 0.4 MG/DL (ref 0.1–2)
BUN BLD-MCNC: 13 MG/DL (ref 9–23)
CALCIUM BLD-MCNC: 9 MG/DL (ref 8.5–10.1)
CHLORIDE SERPL-SCNC: 110 MMOL/L (ref 98–112)
CHOLEST SERPL-MCNC: 143 MG/DL (ref ?–200)
CO2 SERPL-SCNC: 29 MMOL/L (ref 21–32)
CREAT BLD-MCNC: 0.92 MG/DL
EGFRCR SERPLBLD CKD-EPI 2021: 80 ML/MIN/1.73M2 (ref 60–?)
EOSINOPHIL # BLD AUTO: 0.47 X10(3) UL (ref 0–0.7)
EOSINOPHIL NFR BLD AUTO: 9.4 %
ERYTHROCYTE [DISTWIDTH] IN BLOOD BY AUTOMATED COUNT: 12.5 %
EST. AVERAGE GLUCOSE BLD GHB EST-MCNC: 117 MG/DL (ref 68–126)
FASTING PATIENT LIPID ANSWER: YES
FASTING STATUS PATIENT QL REPORTED: YES
GLOBULIN PLAS-MCNC: 3.8 G/DL (ref 2.8–4.4)
GLUCOSE BLD-MCNC: 94 MG/DL (ref 70–99)
HBA1C MFR BLD: 5.7 % (ref ?–5.7)
HCT VFR BLD AUTO: 40.8 %
HDLC SERPL-MCNC: 48 MG/DL (ref 40–59)
HGB BLD-MCNC: 13.5 G/DL
IMM GRANULOCYTES # BLD AUTO: 0.01 X10(3) UL (ref 0–1)
IMM GRANULOCYTES NFR BLD: 0.2 %
LDLC SERPL CALC-MCNC: 75 MG/DL (ref ?–100)
LYMPHOCYTES # BLD AUTO: 1.94 X10(3) UL (ref 1–4)
LYMPHOCYTES NFR BLD AUTO: 38.7 %
MCH RBC QN AUTO: 29.9 PG (ref 26–34)
MCHC RBC AUTO-ENTMCNC: 33.1 G/DL (ref 31–37)
MCV RBC AUTO: 90.3 FL
MONOCYTES # BLD AUTO: 0.43 X10(3) UL (ref 0.1–1)
MONOCYTES NFR BLD AUTO: 8.6 %
NEUTROPHILS # BLD AUTO: 2.1 X10 (3) UL (ref 1.5–7.7)
NEUTROPHILS # BLD AUTO: 2.1 X10(3) UL (ref 1.5–7.7)
NEUTROPHILS NFR BLD AUTO: 41.9 %
NONHDLC SERPL-MCNC: 95 MG/DL (ref ?–130)
OSMOLALITY SERPL CALC.SUM OF ELEC: 296 MOSM/KG (ref 275–295)
PLATELET # BLD AUTO: 271 10(3)UL (ref 150–450)
POTASSIUM SERPL-SCNC: 4.3 MMOL/L (ref 3.5–5.1)
PROT SERPL-MCNC: 7.4 G/DL (ref 6.4–8.2)
RBC # BLD AUTO: 4.52 X10(6)UL
SODIUM SERPL-SCNC: 143 MMOL/L (ref 136–145)
TRIGL SERPL-MCNC: 108 MG/DL (ref 30–149)
VLDLC SERPL CALC-MCNC: 17 MG/DL (ref 0–30)
WBC # BLD AUTO: 5 X10(3) UL (ref 4–11)

## 2023-12-15 PROCEDURE — 80061 LIPID PANEL: CPT

## 2023-12-15 PROCEDURE — 80053 COMPREHEN METABOLIC PANEL: CPT

## 2023-12-15 PROCEDURE — 36415 COLL VENOUS BLD VENIPUNCTURE: CPT

## 2023-12-15 PROCEDURE — 83036 HEMOGLOBIN GLYCOSYLATED A1C: CPT

## 2023-12-15 PROCEDURE — 85025 COMPLETE CBC W/AUTO DIFF WBC: CPT

## 2024-01-17 ENCOUNTER — HOSPITAL ENCOUNTER (OUTPATIENT)
Dept: MAMMOGRAPHY | Age: 44
Discharge: HOME | End: 2024-01-17
Attending: FAMILY MEDICINE
Payer: COMMERCIAL

## 2024-01-17 DIAGNOSIS — Z12.31 BREAST CANCER SCREENING BY MAMMOGRAM: ICD-10-CM

## 2024-01-17 PROCEDURE — 77067 SCR MAMMO BI INCL CAD: CPT | Performed by: FAMILY MEDICINE

## 2024-01-17 PROCEDURE — 77063 BREAST TOMOSYNTHESIS BI: CPT | Performed by: FAMILY MEDICINE

## 2024-12-11 ENCOUNTER — OFFICE VISIT (OUTPATIENT)
Dept: FAMILY MEDICINE CLINIC | Facility: CLINIC | Age: 44
End: 2024-12-11
Payer: COMMERCIAL

## 2024-12-11 VITALS
HEIGHT: 67 IN | HEART RATE: 82 BPM | SYSTOLIC BLOOD PRESSURE: 114 MMHG | BODY MASS INDEX: 35 KG/M2 | TEMPERATURE: 98 F | WEIGHT: 223 LBS | OXYGEN SATURATION: 98 % | DIASTOLIC BLOOD PRESSURE: 70 MMHG | RESPIRATION RATE: 16 BRPM

## 2024-12-11 DIAGNOSIS — R73.03 PREDIABETES: ICD-10-CM

## 2024-12-11 DIAGNOSIS — Z00.00 ROUTINE MEDICAL EXAM: Primary | ICD-10-CM

## 2024-12-11 DIAGNOSIS — Z12.31 ENCOUNTER FOR SCREENING MAMMOGRAM FOR MALIGNANT NEOPLASM OF BREAST: ICD-10-CM

## 2024-12-11 DIAGNOSIS — E66.811 CLASS 1 OBESITY DUE TO EXCESS CALORIES WITHOUT SERIOUS COMORBIDITY WITH BODY MASS INDEX (BMI) OF 34.0 TO 34.9 IN ADULT: ICD-10-CM

## 2024-12-11 DIAGNOSIS — Z00.00 LABORATORY EXAM ORDERED AS PART OF ROUTINE GENERAL MEDICAL EXAMINATION: ICD-10-CM

## 2024-12-11 DIAGNOSIS — E66.09 CLASS 1 OBESITY DUE TO EXCESS CALORIES WITHOUT SERIOUS COMORBIDITY WITH BODY MASS INDEX (BMI) OF 34.0 TO 34.9 IN ADULT: ICD-10-CM

## 2024-12-11 PROBLEM — N95.9 PREMENOPAUSAL PATIENT: Status: RESOLVED | Noted: 2022-09-23 | Resolved: 2024-12-11

## 2024-12-11 PROBLEM — M77.11 LATERAL EPICONDYLITIS OF RIGHT ELBOW: Status: RESOLVED | Noted: 2019-10-19 | Resolved: 2024-12-11

## 2024-12-11 NOTE — PROGRESS NOTES
Chief Complaint   Patient presents with    Physical     Reviewed Preventative/Wellness form with patient.      Weight Problem     Requesting to discuss weight loss options.       HPI:  Nichelle Jane is a 44 year old female here today for preventative visit.      Imms- up to date with flu, tdap. Discussed covid.      Cervical cancer screening- No h/o abnormal paps, HPV, or genital warts. Normal co-testing  3/22/19 & 22 with Dr. Hyatt. Told to repeat in 3 yrs, but could go to 5 yrs.   No period x 2 yrs so  when 43 y/o and asking if she should have the paraguard IUD removed. I told her to discuss with gyne regarding this.      Breast cancer screening- No known family history of breast/ovarian cancer. Normal mamm 24, due soon      Colon cancer screening- No early family h/o colon cancer.       Osteoporosis screening- no reason identified for early screening with dexa      Diet/exercise- walking 4-5mi/d but L hip pain and now doing PT. sTruggling with weight and would like to discuss further.      Dental/Eye Check up-  Recommended pt see dentist once every 6 months for a cleaning and once every year for an eye exam.        Past Medical History:    Allergy    allergy to dog dander/bee stings    Allergy to dog dander    Bee sting allergy    History of pregnancy    -SAB; -17 hr labor , Harjinder (ML); 2010-6 hr labor , \"Kelly\" DWAYNE; 2012-, APGAR:9 (1 min) 9 (5 min)  (10 min)  -1st degree perineal laceration     Menorrhagia with irregular cycle    Premenopausal patient     Past Surgical History:   Procedure Laterality Date      , 2010, 2012     Medications Ordered Prior to Encounter[1]  Allergies[2]  Social History     Socioeconomic History    Marital status:      Spouse name: Not on file    Number of children: Not on file    Years of education: Not on file    Highest education level: Not on file   Occupational History    Not on file   Tobacco Use     Smoking status: Never    Smokeless tobacco: Never   Vaping Use    Vaping status: Never Used   Substance and Sexual Activity    Alcohol use: Yes     Alcohol/week: 0.0 standard drinks of alcohol     Comment: SOCIALLY    Drug use: No    Sexual activity: Yes     Partners: Male     Birth control/protection: Paragard   Other Topics Concern     Service Not Asked    Blood Transfusions Not Asked    Caffeine Concern Yes     Comment: 1 cup coffee daily    Occupational Exposure Not Asked    Hobby Hazards Not Asked    Sleep Concern Not Asked    Stress Concern Not Asked    Weight Concern Not Asked    Special Diet Not Asked    Back Care Not Asked    Exercise Yes     Comment: 2-3 times a week     Bike Helmet Not Asked    Seat Belt Yes    Self-Exams Not Asked   Social History Narrative    Not on file     Social Drivers of Health     Financial Resource Strain: Not on file   Food Insecurity: Not on file   Transportation Needs: Not on file   Physical Activity: Not on file   Stress: Not on file   Social Connections: Not on file   Housing Stability: Not on file     Family History   Problem Relation Age of Onset    Diabetes Maternal Grandmother     Diabetes Paternal Grandmother     Heart Disease Paternal Grandfather     Heart Attack Paternal Grandfather        Review of Systems - All systems reviewed and negative except for HPI    PHYSICAL EXAM:  /70   Pulse 82   Temp 97.9 °F (36.6 °C) (Temporal)   Resp 16   Ht 5' 7\" (1.702 m)   Wt 223 lb (101.2 kg)   LMP 12/31/2022   SpO2 98%   BMI 34.93 kg/m²   GENERAL APPEARANCE:  Alert, no acute distress, appears stated age  HEENT:  Head- Normocephalic, atraumatic.    Eyes- Extraocular movements intact, pupils equally round and reactive to light,  conjunctivae normal.    Ears- Tympanic membranes intact bilaterally.    Nose- Patent, normal septum and turbinates.    Mouth/Throat- Normal oral mucosa, throat non-erythematous.  NECK:  No submental, submandibular, ant/post cervical  lymphadenopathy. No thyromegaly or masses.  PULMONARY:  Lungs clear to auscultation bilaterally. No wheezes, rales, or rhonchi. Normal respiratory effort.  CARDIOVASCULAR:  Regular rate and rhythm. No murmurs, gallops, or rubs.  ABDOMEN:  + bowel sounds, soft, nontender, nondistended. No hepatomegaly.  MUSCULOSKELETAL: Strength of upper and lower extremities 5/5 bilaterally. Normal gait.  NEUROLOGIC:  Cranial nerves 2-12 grossly intact.  PSYCHIATRIC:  Normal mood, affect, and hygiene.     ASSESSMENT/PLAN:    1. Routine medical exam    2. Encounter for screening mammogram for malignant neoplasm of breast    3. Laboratory exam ordered as part of routine general medical examination    4. Prediabetes        Patient verbalized understanding and agrees to plan.      No follow-ups on file.         [1]   Current Outpatient Medications on File Prior to Visit   Medication Sig Dispense Refill    PARAGARD INTRAUTERINE COPPER Intrauterine IUD by Intrauterine route.       No current facility-administered medications on file prior to visit.   [2] No Known Allergies

## 2024-12-16 ENCOUNTER — LAB ENCOUNTER (OUTPATIENT)
Dept: LAB | Age: 44
End: 2024-12-16
Attending: FAMILY MEDICINE
Payer: COMMERCIAL

## 2024-12-16 DIAGNOSIS — R73.03 PREDIABETES: ICD-10-CM

## 2024-12-16 DIAGNOSIS — Z00.00 LABORATORY EXAM ORDERED AS PART OF ROUTINE GENERAL MEDICAL EXAMINATION: ICD-10-CM

## 2024-12-16 DIAGNOSIS — E66.811 CLASS 1 OBESITY DUE TO EXCESS CALORIES WITHOUT SERIOUS COMORBIDITY WITH BODY MASS INDEX (BMI) OF 34.0 TO 34.9 IN ADULT: ICD-10-CM

## 2024-12-16 DIAGNOSIS — E66.09 CLASS 1 OBESITY DUE TO EXCESS CALORIES WITHOUT SERIOUS COMORBIDITY WITH BODY MASS INDEX (BMI) OF 34.0 TO 34.9 IN ADULT: ICD-10-CM

## 2024-12-16 LAB
ALBUMIN SERPL-MCNC: 4.7 G/DL (ref 3.2–4.8)
ALBUMIN/GLOB SERPL: 1.6 {RATIO} (ref 1–2)
ALP LIVER SERPL-CCNC: 60 U/L
ALT SERPL-CCNC: 22 U/L
ANION GAP SERPL CALC-SCNC: 7 MMOL/L (ref 0–18)
AST SERPL-CCNC: 23 U/L (ref ?–34)
BASOPHILS # BLD AUTO: 0.06 X10(3) UL (ref 0–0.2)
BASOPHILS NFR BLD AUTO: 1 %
BILIRUB SERPL-MCNC: 0.5 MG/DL (ref 0.3–1.2)
BUN BLD-MCNC: 16 MG/DL (ref 9–23)
CALCIUM BLD-MCNC: 9.7 MG/DL (ref 8.7–10.4)
CHLORIDE SERPL-SCNC: 108 MMOL/L (ref 98–112)
CHOLEST SERPL-MCNC: 173 MG/DL (ref ?–200)
CO2 SERPL-SCNC: 29 MMOL/L (ref 21–32)
CREAT BLD-MCNC: 0.87 MG/DL
EGFRCR SERPLBLD CKD-EPI 2021: 85 ML/MIN/1.73M2 (ref 60–?)
EOSINOPHIL # BLD AUTO: 0.47 X10(3) UL (ref 0–0.7)
EOSINOPHIL NFR BLD AUTO: 8.2 %
ERYTHROCYTE [DISTWIDTH] IN BLOOD BY AUTOMATED COUNT: 12.6 %
EST. AVERAGE GLUCOSE BLD GHB EST-MCNC: 114 MG/DL (ref 68–126)
FASTING PATIENT LIPID ANSWER: YES
FASTING STATUS PATIENT QL REPORTED: YES
GLOBULIN PLAS-MCNC: 3 G/DL (ref 2–3.5)
GLUCOSE BLD-MCNC: 86 MG/DL (ref 70–99)
HBA1C MFR BLD: 5.6 % (ref ?–5.7)
HCT VFR BLD AUTO: 44.9 %
HDLC SERPL-MCNC: 51 MG/DL (ref 40–59)
HGB BLD-MCNC: 14.5 G/DL
IMM GRANULOCYTES # BLD AUTO: 0.01 X10(3) UL (ref 0–1)
IMM GRANULOCYTES NFR BLD: 0.2 %
LDLC SERPL CALC-MCNC: 102 MG/DL (ref ?–100)
LYMPHOCYTES # BLD AUTO: 2.12 X10(3) UL (ref 1–4)
LYMPHOCYTES NFR BLD AUTO: 37 %
MCH RBC QN AUTO: 30 PG (ref 26–34)
MCHC RBC AUTO-ENTMCNC: 32.3 G/DL (ref 31–37)
MCV RBC AUTO: 93 FL
MONOCYTES # BLD AUTO: 0.48 X10(3) UL (ref 0.1–1)
MONOCYTES NFR BLD AUTO: 8.4 %
NEUTROPHILS # BLD AUTO: 2.59 X10 (3) UL (ref 1.5–7.7)
NEUTROPHILS # BLD AUTO: 2.59 X10(3) UL (ref 1.5–7.7)
NEUTROPHILS NFR BLD AUTO: 45.2 %
NONHDLC SERPL-MCNC: 122 MG/DL (ref ?–130)
OSMOLALITY SERPL CALC.SUM OF ELEC: 298 MOSM/KG (ref 275–295)
PLATELET # BLD AUTO: 283 10(3)UL (ref 150–450)
POTASSIUM SERPL-SCNC: 4.2 MMOL/L (ref 3.5–5.1)
PROT SERPL-MCNC: 7.7 G/DL (ref 5.7–8.2)
RBC # BLD AUTO: 4.83 X10(6)UL
SODIUM SERPL-SCNC: 144 MMOL/L (ref 136–145)
TRIGL SERPL-MCNC: 114 MG/DL (ref 30–149)
TSI SER-ACNC: 3.46 UIU/ML (ref 0.55–4.78)
VLDLC SERPL CALC-MCNC: 19 MG/DL (ref 0–30)
WBC # BLD AUTO: 5.7 X10(3) UL (ref 4–11)

## 2024-12-16 PROCEDURE — 84443 ASSAY THYROID STIM HORMONE: CPT

## 2024-12-16 PROCEDURE — 80061 LIPID PANEL: CPT

## 2024-12-16 PROCEDURE — 83036 HEMOGLOBIN GLYCOSYLATED A1C: CPT

## 2024-12-16 PROCEDURE — 85025 COMPLETE CBC W/AUTO DIFF WBC: CPT

## 2024-12-16 PROCEDURE — 36415 COLL VENOUS BLD VENIPUNCTURE: CPT

## 2024-12-16 PROCEDURE — 80053 COMPREHEN METABOLIC PANEL: CPT

## 2024-12-20 ENCOUNTER — HOSPITAL ENCOUNTER (EMERGENCY)
Age: 44
Discharge: HOME | End: 2024-12-20
Attending: EMERGENCY MEDICINE
Payer: COMMERCIAL

## 2024-12-20 ENCOUNTER — APPOINTMENT (OUTPATIENT)
Dept: CT IMAGING | Age: 44
End: 2024-12-20
Attending: EMERGENCY MEDICINE
Payer: COMMERCIAL

## 2024-12-20 VITALS
OXYGEN SATURATION: 96 % | HEART RATE: 74 BPM | RESPIRATION RATE: 16 BRPM | DIASTOLIC BLOOD PRESSURE: 55 MMHG | WEIGHT: 205 LBS | TEMPERATURE: 98 F | BODY MASS INDEX: 34.16 KG/M2 | SYSTOLIC BLOOD PRESSURE: 106 MMHG | HEIGHT: 65 IN

## 2024-12-20 DIAGNOSIS — R10.9 ABDOMINAL PAIN OF UNKNOWN ETIOLOGY: ICD-10-CM

## 2024-12-20 DIAGNOSIS — K57.92 ACUTE DIVERTICULITIS: Primary | ICD-10-CM

## 2024-12-20 LAB
ALBUMIN SERPL-MCNC: 4.6 G/DL (ref 3.2–4.8)
ALBUMIN/GLOB SERPL: 1.6 {RATIO} (ref 1–2)
ALP LIVER SERPL-CCNC: 63 U/L
ALT SERPL-CCNC: 20 U/L
ANION GAP SERPL CALC-SCNC: 5 MMOL/L (ref 0–18)
AST SERPL-CCNC: 20 U/L (ref ?–34)
B-HCG UR QL: NEGATIVE
BASOPHILS # BLD AUTO: 0.06 X10(3) UL (ref 0–0.2)
BASOPHILS NFR BLD AUTO: 0.9 %
BILIRUB SERPL-MCNC: 0.6 MG/DL (ref 0.3–1.2)
BILIRUB UR QL STRIP.AUTO: NEGATIVE
BUN BLD-MCNC: 13 MG/DL (ref 9–23)
CALCIUM BLD-MCNC: 9.6 MG/DL (ref 8.7–10.4)
CHLORIDE SERPL-SCNC: 106 MMOL/L (ref 98–112)
CLARITY UR REFRACT.AUTO: CLEAR
CO2 SERPL-SCNC: 29 MMOL/L (ref 21–32)
COLOR UR AUTO: YELLOW
CREAT BLD-MCNC: 0.85 MG/DL
EGFRCR SERPLBLD CKD-EPI 2021: 87 ML/MIN/1.73M2 (ref 60–?)
EOSINOPHIL # BLD AUTO: 0.51 X10(3) UL (ref 0–0.7)
EOSINOPHIL NFR BLD AUTO: 7.3 %
ERYTHROCYTE [DISTWIDTH] IN BLOOD BY AUTOMATED COUNT: 12.5 %
GLOBULIN PLAS-MCNC: 2.9 G/DL (ref 2–3.5)
GLUCOSE BLD-MCNC: 97 MG/DL (ref 70–99)
GLUCOSE UR STRIP.AUTO-MCNC: NEGATIVE MG/DL
HCT VFR BLD AUTO: 39.5 %
HGB BLD-MCNC: 13.7 G/DL
IMM GRANULOCYTES # BLD AUTO: 0.02 X10(3) UL (ref 0–1)
IMM GRANULOCYTES NFR BLD: 0.3 %
KETONES UR STRIP.AUTO-MCNC: NEGATIVE MG/DL
LYMPHOCYTES # BLD AUTO: 2.08 X10(3) UL (ref 1–4)
LYMPHOCYTES NFR BLD AUTO: 29.8 %
MCH RBC QN AUTO: 30.6 PG (ref 26–34)
MCHC RBC AUTO-ENTMCNC: 34.7 G/DL (ref 31–37)
MCV RBC AUTO: 88.4 FL
MONOCYTES # BLD AUTO: 0.63 X10(3) UL (ref 0.1–1)
MONOCYTES NFR BLD AUTO: 9 %
NEUTROPHILS # BLD AUTO: 3.68 X10 (3) UL (ref 1.5–7.7)
NEUTROPHILS # BLD AUTO: 3.68 X10(3) UL (ref 1.5–7.7)
NEUTROPHILS NFR BLD AUTO: 52.7 %
NITRITE UR QL STRIP.AUTO: NEGATIVE
OSMOLALITY SERPL CALC.SUM OF ELEC: 290 MOSM/KG (ref 275–295)
PH UR STRIP.AUTO: 6 [PH] (ref 5–8)
PLATELET # BLD AUTO: 265 10(3)UL (ref 150–450)
POTASSIUM SERPL-SCNC: 4.3 MMOL/L (ref 3.5–5.1)
PROT SERPL-MCNC: 7.5 G/DL (ref 5.7–8.2)
PROT UR STRIP.AUTO-MCNC: NEGATIVE MG/DL
RBC # BLD AUTO: 4.47 X10(6)UL
RBC UR QL AUTO: NEGATIVE
SODIUM SERPL-SCNC: 140 MMOL/L (ref 136–145)
SP GR UR STRIP.AUTO: 1.01 (ref 1–1.03)
UROBILINOGEN UR STRIP.AUTO-MCNC: 0.2 MG/DL
WBC # BLD AUTO: 7 X10(3) UL (ref 4–11)

## 2024-12-20 PROCEDURE — 87186 SC STD MICRODIL/AGAR DIL: CPT | Performed by: EMERGENCY MEDICINE

## 2024-12-20 PROCEDURE — 81015 MICROSCOPIC EXAM OF URINE: CPT | Performed by: EMERGENCY MEDICINE

## 2024-12-20 PROCEDURE — 81025 URINE PREGNANCY TEST: CPT

## 2024-12-20 PROCEDURE — 87077 CULTURE AEROBIC IDENTIFY: CPT | Performed by: EMERGENCY MEDICINE

## 2024-12-20 PROCEDURE — 99285 EMERGENCY DEPT VISIT HI MDM: CPT

## 2024-12-20 PROCEDURE — 81001 URINALYSIS AUTO W/SCOPE: CPT

## 2024-12-20 PROCEDURE — 81015 MICROSCOPIC EXAM OF URINE: CPT

## 2024-12-20 PROCEDURE — 87086 URINE CULTURE/COLONY COUNT: CPT | Performed by: EMERGENCY MEDICINE

## 2024-12-20 PROCEDURE — 99284 EMERGENCY DEPT VISIT MOD MDM: CPT

## 2024-12-20 PROCEDURE — 80053 COMPREHEN METABOLIC PANEL: CPT | Performed by: EMERGENCY MEDICINE

## 2024-12-20 PROCEDURE — 74177 CT ABD & PELVIS W/CONTRAST: CPT | Performed by: EMERGENCY MEDICINE

## 2024-12-20 PROCEDURE — 85025 COMPLETE CBC W/AUTO DIFF WBC: CPT | Performed by: EMERGENCY MEDICINE

## 2024-12-20 PROCEDURE — 96365 THER/PROPH/DIAG IV INF INIT: CPT

## 2024-12-20 PROCEDURE — 96375 TX/PRO/DX INJ NEW DRUG ADDON: CPT

## 2024-12-20 RX ORDER — CEFPODOXIME PROXETIL 200 MG/1
200 TABLET, FILM COATED ORAL 2 TIMES DAILY
Qty: 14 TABLET | Refills: 0 | Status: SHIPPED | OUTPATIENT
Start: 2024-12-20 | End: 2024-12-27

## 2024-12-20 RX ORDER — KETOROLAC TROMETHAMINE 30 MG/ML
30 INJECTION, SOLUTION INTRAMUSCULAR; INTRAVENOUS ONCE
Status: COMPLETED | OUTPATIENT
Start: 2024-12-20 | End: 2024-12-20

## 2024-12-20 RX ORDER — METRONIDAZOLE 500 MG/1
500 TABLET ORAL ONCE
Status: COMPLETED | OUTPATIENT
Start: 2024-12-20 | End: 2024-12-20

## 2024-12-20 RX ORDER — METRONIDAZOLE 500 MG/1
500 TABLET ORAL 3 TIMES DAILY
Qty: 30 TABLET | Refills: 0 | Status: SHIPPED | OUTPATIENT
Start: 2024-12-20 | End: 2024-12-30

## 2024-12-20 NOTE — ED PROVIDER NOTES
Patient Seen in: [facility] In [place]      History     Chief Complaint   Patient presents with    Abdominal Pain     Stated Complaint: lower left abd pain    Subjective:   HPI        This is a 44-year-old female who stated that on Wednesday she had some mild left lower quadrant pain left-sided abdominal pain is progressively gotten worse it comes and goes it is sharp and it was intermittent in nature but now it is starting to be constant.  The patient has had no vaginal discharge, vaginal bleeding, no dysuria no fevers no chills no nausea no vomiting.  The pain is moderate in nature.  Is not aggravated relieved with anything.  No history of kidney stones or ovarian cyst.  No history of diverticulitis.  Objective:     Past Medical History:    Allergy to dog dander    Bee sting allergy    History of pregnancy    -SAB; -17 hr labor , Harjinder (MLM); 2010-6 hr labor , \"Kelly\" DWAYNE; 2012-, APGAR:9 (1 min) 9 (5 min)  (10 min)  -1st degree perineal laceration     Menorrhagia with irregular cycle    Prediabetes              Past Surgical History:   Procedure Laterality Date      , 2010, 2012                Social History     Socioeconomic History    Marital status:    Tobacco Use    Smoking status: Never    Smokeless tobacco: Never   Vaping Use    Vaping status: Never Used   Substance and Sexual Activity    Alcohol use: Yes     Comment: 1-2x/mo 1-2 glasses, never a problem    Drug use: No    Sexual activity: Yes     Partners: Male     Birth control/protection: Paragard     Comment: Placed    Other Topics Concern    Caffeine Concern Yes     Comment: 1 cup coffee daily    Exercise Yes     Comment: 2-3 times a week     Seat Belt Yes                  Physical Exam     ED Triage Vitals [24 0752]   /74   Pulse 86   Resp 16   Temp 98.1 °F (36.7 °C)   Temp src Oral   SpO2 97 %   O2 Device None (Room air)       Current Vitals:   Vital  Signs  BP: 106/55  Pulse: 74  Resp: 16  Temp: 98.1 °F (36.7 °C)  Temp src: Oral    Oxygen Therapy  SpO2: 96 %  O2 Device: None (Room air)          Physical Exam   General: .  Female she is in some moderate discomfort secondary to pain tender in the left lower quadrant.  The patient is in no respiratory distress    HEENT: Atraumatic, conjunctiva are not pale.  There is no icterus.  Oral mucosa Is wet.  No facial trauma.  The neck is supple.    LUNGS: Clear to auscultation, there is no wheezing or retraction.  No crackles.    CV: Cardiovascular is regular without murmurs or rubs.    ABD: The abdomen is soft nondistended moderately tender in the left lower quadrant.  There is no rebound.  There is no guarding.  There is no right lower quadrant tenderness no CVA tenderness is noted.    EXT: There is good pulses bilaterally.  There is no calf tenderness.  There is no rash noted.  There is no edema    NEURO: Alert and oriented x4.  Muscle strength and sensory exam is grossly normal.  And the patient is neurologically intact with no focal findings.    ED Course     Labs Reviewed   URINALYSIS WITH CULTURE REFLEX - Abnormal; Notable for the following components:       Result Value    Leukocyte Esterase Urine Small (*)     All other components within normal limits   UA MICROSCOPIC ONLY, URINE - Abnormal; Notable for the following components:    WBC Urine 6-10 (*)     Squamous Epi. Cells Few (*)     All other components within normal limits   COMP METABOLIC PANEL (14) - Normal   POCT PREGNANCY URINE - Normal   CBC WITH DIFFERENTIAL WITH PLATELET   RAINBOW DRAW LAVENDER   RAINBOW DRAW LIGHT GREEN   URINE CULTURE, ROUTINE            Workup was done to rule out diverticulitis kidney stones pyelonephritis, ovarian cyst was considered       MDM        Pregnancy was negative, comprehensive was normal urinalysis did show findings of a urinary tract infection CBC was normal.    I personally reviewed the radiographs and my individual  interpretation shows  No obvious obstruction, perforation  Diverticulitis    Also reviewed official report and it shows      CT ABDOMEN+PELVIS(CONTRAST ONLY)(CPT=74177)    Result Date: 12/20/2024  PROCEDURE:  CT ABDOMEN+PELVIS (CONTRAST ONLY) (CPT=74177)  COMPARISON:  None.  INDICATIONS:  lower left abd pain  TECHNIQUE:  CT scanning was performed from the dome of the diaphragm to the pubic symphysis with non-ionic intravenous contrast material. Post contrast coronal MPR imaging was performed.  Dose reduction techniques were used. Dose information is transmitted to the ACR (American College of Radiology) NRDR (National Radiology Data Registry) which includes the Dose Index Registry.  PATIENT STATED HISTORY:(As transcribed by Technologist)  Increasing LLQ pain for 2 days.   CONTRAST USED:  100cc of Isovue 370  FINDINGS:  LIVER:  No enlargement, atrophy, abnormal density, or significant focal lesion.  BILIARY:  No visible dilatation or calcification.  PANCREAS:  No lesion, fluid collection, ductal dilatation, or atrophy.  SPLEEN:  No enlargement or focal lesion.  KIDNEYS:  There is a 4 mm nonobstructing calcification in the lower pole of the right kidney.  There are no obstructing stones. ADRENALS:  No mass or enlargement.  AORTA/VASCULAR:  No aneurysm or dissection.  RETROPERITONEUM:  No mass or adenopathy.  BOWEL/MESENTERY:  There is stranding around diverticula at the junction of the descending and sigmoid colon (series 2, image 95).  This is consistent with acute diverticulitis.  There is no free air or abscess. ABDOMINAL WALL:  Fat containing periumbilical hernia is noted. URINARY BLADDER:  No visible focal wall thickening, lesion, or calculus.  PELVIC NODES:  No adenopathy.  PELVIC ORGANS:  T-shaped intrauterine contraceptive device is noted. BONES:  No bony lesion or fracture.  LUNG BASES:  No visible pulmonary or pleural disease.  OTHER:  Negative.             CONCLUSION:  1. There is acute diverticulitis at  the junction of descending and sigmoid colon.  There is no free air or abscess. 2. Small fat containing periumbilical hernia.   LOCATION:  [facility]   Dictated by (CST): Dany Garay MD on 12/20/2024 at 9:27 AM     Finalized by (CST): Dany Garay MD on 12/20/2024 at 9:30 AM        I did go back and reexamined the patient prior to discharge.  The patient has some minimal tenderness in the left lower quadrant.  There is no rebound, guarding.  I discussed with her that it is probably more diverticulitis and UTI but will treat for both.  Did talk to pharmacy will place on third-generation cephalosporins, Flagyl I discussed with her clear liquid slowly advance her diet close follow-up with the prime MD for GI consultation if she has increasing pain or discomfort or fever she needs to return here I discussed importance of close follow-up and return if increasing pain.        Medical Decision Making      Disposition and Plan     Clinical Impression:  1. Acute diverticulitis    2. Abdominal pain of unknown etiology         Disposition:  Discharge  12/20/2024  9:40 am    Follow-up:  Bebeto Hyatt,   [address] [facility] [address] [address]  [phone]    Follow up in 2 day(s)            Medications Prescribed:  Discharge Medication List as of 12/20/2024  9:43 AM        START taking these medications    Details   metroNIDAZOLE 500 MG Oral Tab Take 1 tablet (500 mg total) by mouth 3 (three) times daily for 10 days., Normal, Disp-30 tablet, R-0      cefpodoxime 200 MG Oral Tab Take 1 tablet (200 mg total) by mouth 2 (two) times daily for 7 days., Normal, Disp-14 tablet, R-0                 Supplementary Documentation:

## 2024-12-20 NOTE — DISCHARGE INSTRUCTIONS
Clear liquid slowly advance your diet take Motrin, Tylenol start antibiotics if you have increasing pain discomfort fevers or chills you need to return here    Follow-up with your primary MD for GI consultation.    You were seen in the emergency room in a limited time.  There is a possibility that although we do not see any acute process at this present time that things can change with time.  Is therefore imperative that you follow-up with primary care physician for close follow-up.  If there is any significant progression of your pain  or other symptoms you to return immediately to the emergency room.

## 2024-12-31 ENCOUNTER — OFFICE VISIT (OUTPATIENT)
Dept: FAMILY MEDICINE CLINIC | Facility: CLINIC | Age: 44
End: 2024-12-31
Payer: COMMERCIAL

## 2024-12-31 VITALS
HEART RATE: 98 BPM | TEMPERATURE: 98 F | BODY MASS INDEX: 36.99 KG/M2 | RESPIRATION RATE: 16 BRPM | DIASTOLIC BLOOD PRESSURE: 60 MMHG | HEIGHT: 65 IN | SYSTOLIC BLOOD PRESSURE: 100 MMHG | WEIGHT: 222 LBS | OXYGEN SATURATION: 97 %

## 2024-12-31 DIAGNOSIS — E28.319 EARLY MENOPAUSE OCCURRING IN PATIENT AGE YOUNGER THAN 45 YEARS: ICD-10-CM

## 2024-12-31 DIAGNOSIS — E66.812 CLASS 2 OBESITY DUE TO EXCESS CALORIES WITHOUT SERIOUS COMORBIDITY WITH BODY MASS INDEX (BMI) OF 36.0 TO 36.9 IN ADULT: Primary | ICD-10-CM

## 2024-12-31 DIAGNOSIS — Z83.3 FAMILY HISTORY OF DIABETES MELLITUS IN MOTHER: ICD-10-CM

## 2024-12-31 DIAGNOSIS — E66.09 CLASS 2 OBESITY DUE TO EXCESS CALORIES WITHOUT SERIOUS COMORBIDITY WITH BODY MASS INDEX (BMI) OF 36.0 TO 36.9 IN ADULT: Primary | ICD-10-CM

## 2024-12-31 DIAGNOSIS — Z87.898 HISTORY OF PREDIABETES: ICD-10-CM

## 2024-12-31 PROCEDURE — 3074F SYST BP LT 130 MM HG: CPT | Performed by: FAMILY MEDICINE

## 2024-12-31 PROCEDURE — 3078F DIAST BP <80 MM HG: CPT | Performed by: FAMILY MEDICINE

## 2024-12-31 PROCEDURE — 3008F BODY MASS INDEX DOCD: CPT | Performed by: FAMILY MEDICINE

## 2024-12-31 PROCEDURE — 99213 OFFICE O/P EST LOW 20 MIN: CPT | Performed by: FAMILY MEDICINE

## 2024-12-31 RX ORDER — TIRZEPATIDE 2.5 MG/.5ML
2.5 INJECTION, SOLUTION SUBCUTANEOUS WEEKLY
Qty: 2 ML | Refills: 0 | Status: SHIPPED | OUTPATIENT
Start: 2024-12-31 | End: 2025-01-22

## 2024-12-31 NOTE — PROGRESS NOTES
Note to patient: The 21st Century Cures Act makes medical notes like these available to patients in the interest of transparency. However, be advised this is a medical document. It is intended as peer to peer communication. It is written in medical language and may contain abbreviations or verbiage that are unfamiliar. It may appear blunt or direct. Medical documents are intended to carry relevant information, facts as evident, and the clinical opinion of the practitioner.         Chief Complaint   Patient presents with    Lab Results    Weight Problem     Patient would like to start a weight loss medication        HPI:    Weight: Patient is here for weight loss counseling and evaluation.   She has been a patient of [facility]. She was on phentermine before up until last year. Weight has been a problem for her for 2-3 years since reaching menopause.   Patient would like to lose weight and is feeling very motivated to do so.   She is walking daily. She developed hip/knee pain and is in PT till February. So her walking and weight bearing exercise has been limited.   Patient's last menstrual period was 12/31/2022.  She has early menopause. Grandmother had early menopause.    She follows with GYN.   Will be removing her IUD.   GYN- Dr. Webster.   Diet: low carb - she reports sine reaching menopause she has been overeating, hard to control her appetite. She feels like she is binge eating. She goes through hunger periods where she over-consumes.   Exercise: not as much currently due to ortho issues with hip/knees. She is still doing the peleton as tolerated given her knee pains.   She has seen dietician.          Labs reviewed:   Last A1c value was 5.6% done 12/16/2024.  .   Tsh normal     Lab Results   Component Value Date    A1C 5.6 12/16/2024    A1C 5.7 (H) 12/15/2023    A1C 5.7 (H) 09/26/2022    A1C 5.8 (H) 10/29/2021    A1C 5.5 03/22/2019            Body mass index is 36.94 kg/m².      Review of Systems    Constitutional: Negative for fever, chills and fatigue. No distress.   Eyes: Negative for pain and visual disturbance.   Respiratory: Negative for cough, chest tightness, shortness of breath and wheezing.    Cardiovascular: Negative for chest pain, palpitations and leg swelling.   Gastrointestinal: Negative for nausea, vomiting, abdominal pain, diarrhea     Patient Active Problem List   Diagnosis    Obesity due to excess calories    Menorrhagia with irregular cycle    Bilateral carpal tunnel syndrome    IUD (intrauterine device) in place    Prediabetes    Early menopause occurring in patient age younger than 45 years    History of prediabetes    Family history of diabetes mellitus in mother       Past Medical History:    Allergic rhinitis    Allergy to dog dander    Anxiety    Bee sting allergy    History of pregnancy    -SAB; -17 hr labor , Harjinder (MLM); 2010-6 hr labor , \"Kelly\" DWAYNE; 2012-, APGAR:9 (1 min) 9 (5 min)  (10 min)  -1st degree perineal laceration     Menorrhagia with irregular cycle    Prediabetes     Past Surgical History:   Procedure Laterality Date      , 2010, 2012     Family History   Problem Relation Age of Onset    No Known Problems Mother     No Known Problems Father     No Known Problems Sister     No Known Problems Sister     Other (IBS) Sister     Diabetes Maternal Grandmother     Pancreatic Cancer Maternal Grandmother     Anemia Maternal Grandmother     Cancer Maternal Grandmother     No Known Problems Maternal Grandfather     Diabetes Paternal Grandmother     Heart Disease Paternal Grandfather     Heart Attack Paternal Grandfather      Social History     Socioeconomic History    Marital status:    Tobacco Use    Smoking status: Never     Passive exposure: Never    Smokeless tobacco: Never   Vaping Use    Vaping status: Never Used   Substance and Sexual Activity    Alcohol use: Yes     Alcohol/week: 1.0 standard drink of alcohol      Types: 1 Glasses of wine per week     Comment: 1-2x/mo 1-2 glasses, never a problem    Drug use: No    Sexual activity: Yes     Partners: Male     Birth control/protection: Paragard     Comment: Placed 2022   Other Topics Concern    Caffeine Concern Yes    Stress Concern No    Weight Concern Yes    Special Diet No    Exercise No    Seat Belt Yes       Current Outpatient Medications   Medication Sig Dispense Refill    Tirzepatide-Weight Management (ZEPBOUND) 2.5 MG/0.5ML Subcutaneous Solution Auto-injector Inject 2.5 mg into the skin once a week for 4 doses. 2 mL 0    PARAGARD INTRAUTERINE COPPER Intrauterine IUD by Intrauterine route.         Allergies  Allergies[1]    Health Maintenance  Immunizations:  Immunization History   Administered Date(s) Administered    Covid-19 Vaccine Pfizer 30 mcg/0.3 ml 02/25/2021, 03/19/2021, 12/02/2021    FLULAVAL 6 months & older 0.5 ml Prefilled syringe (78918) 09/23/2020, 09/23/2021    FLUZONE 6 months and older PFS 0.5 ml (79500) 10/05/2016, 09/25/2017, 09/25/2018, 09/27/2019, 09/23/2020, 09/23/2021    Fluvirin, 3 Years & >, Im 10/22/2014    Influenza 10/01/2009, 10/20/2010, 10/12/2012, 10/17/2022, 10/16/2023    Influenza Vaccine, trivalent (IIV3), PF 0.5mL (66872) 10/30/2024    Influenza Virus Vaccine, H1N1 10/29/2009    TDAP 10/12/2012, 11/17/2023         Physical Exam  /60   Pulse 98   Temp 97.6 °F (36.4 °C) (Temporal)   Resp 16   Ht 5' 5\" (1.651 m)   Wt 222 lb (100.7 kg)   LMP 12/31/2022   SpO2 97%   BMI 36.94 kg/m²   Constitutional: Oriented to person, place, and time. No distress.   HEENT:  Normocephalic and atraumatic.    Neck:  no thyromegaly present.   Cardiovascular: Normal rate, regular rhythm    Pulmonary/Chest: Effort normal and breath sounds normal. No respiratory distress. No wheezes, rhonchi or rales  Abdominal: Soft. Bowel sounds are normal. Non tender        A/P:    Encounter Diagnoses   Name Primary?    Early menopause occurring in patient age  younger than 45 years     Class 2 obesity due to excess calories without serious comorbidity with body mass index (BMI) of 36.0 to 36.9 in adult Yes    History of prediabetes     Family history of diabetes mellitus in mother        Denies personal or FMHx of MEN, medullary thyroid ca, pancreatitis ,gastroparesis.   Pt started on zepbound 2.5mg weekly x 4 weeks, 1 month f/u for dose titration.   Will likely need PA.   Comorbidity: prediabetes.   Reviewed labs   Diet-  low carb and low fat diet coupled with regular aerobic exercise - this is limited currently per ortho instructions.   Discussed medication dosage, usage, side effects, and goals of treatment in detail.  Instructed patient to read medication insert for all side effects and contraindications before starting medication.          No orders of the defined types were placed in this encounter.      Meds & Refills for this Visit:  Requested Prescriptions     Signed Prescriptions Disp Refills    Tirzepatide-Weight Management (ZEPBOUND) 2.5 MG/0.5ML Subcutaneous Solution Auto-injector 2 mL 0     Sig: Inject 2.5 mg into the skin once a week for 4 doses.       Imaging & Consults:  None      Return in about 4 weeks (around 1/28/2025) for medication refills; zepbound f/u weight check .    There are no Patient Instructions on file for this visit.    All questions were answered and the patient understands the plan.     Bebeto Hyatt,         This note was prepared using Dragon Medical voice recognition dictation software. As a result errors may occur. When identified these errors have been corrected. While every attempt is made to correct errors during dictation discrepancies may still exist.      Note to patient: The 21st Century Cures Act makes medical notes like these available to patients in the interest of transparency. However, be advised this is a medical document. It is intended as peer to peer communication. It is written in medical language and may  contain abbreviations or verbiage that are unfamiliar. It may appear blunt or direct. Medical documents are intended to carry relevant information, facts as evident, and the clinical opinion of the practitioner.                [1] No Known Allergies

## 2025-01-03 ENCOUNTER — TELEPHONE (OUTPATIENT)
Dept: FAMILY MEDICINE CLINIC | Facility: CLINIC | Age: 45
End: 2025-01-03

## 2025-01-03 NOTE — TELEPHONE ENCOUNTER
Denial received for Zepbound from [employer]. Per letter, unable to approve request due to: Coverage provided for patients after a trial of behavioral modification and dietary restrictions for at least 3 months. Paerwork placed in provider bin for review.

## 2025-01-07 ENCOUNTER — PATIENT MESSAGE (OUTPATIENT)
Dept: FAMILY MEDICINE CLINIC | Facility: CLINIC | Age: 45
End: 2025-01-07

## 2025-01-21 ENCOUNTER — HOSPITAL ENCOUNTER (OUTPATIENT)
Dept: MAMMOGRAPHY | Age: 45
Discharge: HOME | End: 2025-01-21
Attending: FAMILY MEDICINE
Payer: COMMERCIAL

## 2025-01-21 DIAGNOSIS — Z12.31 ENCOUNTER FOR SCREENING MAMMOGRAM FOR MALIGNANT NEOPLASM OF BREAST: ICD-10-CM

## 2025-01-21 PROCEDURE — 77067 SCR MAMMO BI INCL CAD: CPT | Performed by: FAMILY MEDICINE

## 2025-01-21 PROCEDURE — 77063 BREAST TOMOSYNTHESIS BI: CPT | Performed by: FAMILY MEDICINE

## 2025-02-03 ENCOUNTER — TELEMEDICINE (OUTPATIENT)
Dept: FAMILY MEDICINE CLINIC | Facility: CLINIC | Age: 45
End: 2025-02-03
Payer: COMMERCIAL

## 2025-02-03 DIAGNOSIS — E66.09 CLASS 2 OBESITY DUE TO EXCESS CALORIES WITHOUT SERIOUS COMORBIDITY WITH BODY MASS INDEX (BMI) OF 36.0 TO 36.9 IN ADULT: Primary | ICD-10-CM

## 2025-02-03 DIAGNOSIS — E66.812 CLASS 2 OBESITY DUE TO EXCESS CALORIES WITHOUT SERIOUS COMORBIDITY WITH BODY MASS INDEX (BMI) OF 36.0 TO 36.9 IN ADULT: Primary | ICD-10-CM

## 2025-02-03 RX ORDER — TIRZEPATIDE 5 MG/.5ML
5 INJECTION, SOLUTION SUBCUTANEOUS WEEKLY
Qty: 2 ML | Refills: 0 | Status: SHIPPED | OUTPATIENT
Start: 2025-02-03 | End: 2025-02-25

## 2025-02-03 NOTE — PROGRESS NOTES
Subjective:      No chief complaint on file.    HISTORY OF PRESENT ILLNESS  HPI  HPI obtained per patient report.  Nichelle Jane is a pleasant 44 year old female presenting virtually for follow-up for Zepbound.   She is tolerating Zepbound 2.5 mg well without any issues. She feels that this medication has been helping mildly with appetite modulation.      PAST PATIENT HISTORY  Past Medical History:    Allergic rhinitis    Allergy to dog dander    Anxiety    Bee sting allergy    History of pregnancy    -SAB; -17 hr labor , Harjinder (MLM); 2010-6 hr labor , \"Kelly\" DWAYNE; 2012-, APGAR:9 (1 min) 9 (5 min)  (10 min)  -1st degree perineal laceration     Menorrhagia with irregular cycle    Prediabetes     Past Surgical History:   Procedure Laterality Date      , 2010, 2012       CURRENT MEDICATIONS  Medications Taking[1]    HEALTH MAINTENANCE  Immunization History   Administered Date(s) Administered    Covid-19 Vaccine Pfizer 30 mcg/0.3 ml 2021, 2021, 2021    FLULAVAL 6 months & older 0.5 ml Prefilled syringe (87787) 2020, 2021    FLUZONE 6 months and older PFS 0.5 ml (80590) 10/05/2016, 2017, 2018, 2019, 2020, 2021    Fluvirin, 3 Years & >, Im 10/22/2014    Influenza 10/01/2009, 10/20/2010, 10/12/2012, 10/17/2022, 10/16/2023    Influenza Vaccine, trivalent (IIV3), PF 0.5mL (46960) 10/30/2024    Influenza Virus Vaccine, H1N1 10/29/2009    TDAP 10/12/2012, 2023       ALLERGIES AND DRUG REACTIONS  Allergies[2]    Family History   Problem Relation Age of Onset    No Known Problems Mother     No Known Problems Father     No Known Problems Sister     No Known Problems Sister     Other (IBS) Sister     Diabetes Maternal Grandmother     Pancreatic Cancer Maternal Grandmother     Anemia Maternal Grandmother     Cancer Maternal Grandmother     No Known Problems Maternal Grandfather     Diabetes Paternal Grandmother      Heart Disease Paternal Grandfather     Heart Attack Paternal Grandfather      Social History     Socioeconomic History    Marital status:    Tobacco Use    Smoking status: Never     Passive exposure: Never    Smokeless tobacco: Never   Vaping Use    Vaping status: Never Used   Substance and Sexual Activity    Alcohol use: Yes     Alcohol/week: 1.0 standard drink of alcohol     Types: 1 Glasses of wine per week     Comment: 1-2x/mo 1-2 glasses, never a problem    Drug use: No    Sexual activity: Yes     Partners: Male     Birth control/protection: Paragard     Comment: Placed 2022   Other Topics Concern    Caffeine Concern Yes    Stress Concern No    Weight Concern Yes    Special Diet No    Exercise No    Seat Belt Yes       Review of Systems   All other systems reviewed and are negative.         Objective:      LMP  (LMP Unknown)   There is no height or weight on file to calculate BMI.    Physical Exam  Constitutional:       General: She is not in acute distress.     Appearance: She is not ill-appearing or toxic-appearing.   Pulmonary:      Effort: Pulmonary effort is normal.   Musculoskeletal:      Cervical back: Neck supple.   Neurological:      Mental Status: She is alert and oriented to person, place, and time.   Psychiatric:         Mood and Affect: Mood normal.            Assessment and Plan:      1. Class 2 obesity due to excess calories without serious comorbidity with body mass index (BMI) of 36.0 to 36.9 in adult (Primary)  -     Zepbound; Inject 5 mg into the skin once a week for 4 doses.  Dispense: 2 mL; Refill: 0    Return in about 1 month (around 3/3/2025) for follow-up.    - tolerating Zepbound 2.5 mg well  - she states that she is down 7 lbs since starting this medication 1 month ago   - a shared decision was made to continue her dose titration schedule, and a prescription for Zepbound 5 mg weekly was sent in for another 4 weeks       Patient verbalized understanding of assessment and  recommendations. All questions and concerns were addressed.  Telehealth appointment with video and audio was scheduled and completed with the patient's informed consent. Telehealth appointment took place in context of CDC social distancing guidelines during the Covid-19 pandemic.    Electronically signed by Mark Aguayo MD       [1]   Outpatient Medications Marked as Taking for the 2/3/25 encounter (Telemedicine) with Mark Aguayo MD   Medication Sig Dispense Refill    Tirzepatide-Weight Management (ZEPBOUND) 5 MG/0.5ML Subcutaneous Solution Auto-injector Inject 5 mg into the skin once a week for 4 doses. 2 mL 0   [2] No Known Allergies

## 2025-03-03 ENCOUNTER — TELEMEDICINE (OUTPATIENT)
Dept: FAMILY MEDICINE CLINIC | Facility: CLINIC | Age: 45
End: 2025-03-03
Payer: COMMERCIAL

## 2025-03-03 DIAGNOSIS — E66.812 CLASS 2 OBESITY DUE TO EXCESS CALORIES WITHOUT SERIOUS COMORBIDITY WITH BODY MASS INDEX (BMI) OF 36.0 TO 36.9 IN ADULT: Primary | ICD-10-CM

## 2025-03-03 DIAGNOSIS — E66.09 CLASS 2 OBESITY DUE TO EXCESS CALORIES WITHOUT SERIOUS COMORBIDITY WITH BODY MASS INDEX (BMI) OF 36.0 TO 36.9 IN ADULT: Primary | ICD-10-CM

## 2025-03-03 RX ORDER — TIRZEPATIDE 7.5 MG/.5ML
7.5 INJECTION, SOLUTION SUBCUTANEOUS WEEKLY
Qty: 2 ML | Refills: 0 | Status: SHIPPED | OUTPATIENT
Start: 2025-03-03 | End: 2025-03-25

## 2025-03-03 NOTE — PROGRESS NOTES
Subjective:      No chief complaint on file.    HISTORY OF PRESENT ILLNESS  HPI  HPI obtained per patient report.  Nichelle Jane is a pleasant 44 year old female presenting virtually for follow-up for Zepbound.   She is tolerating Zepbound 5 mg well without any issues. She feels that this medication has been helping with appetite modulation.     PAST PATIENT HISTORY  Past Medical History:    Allergic rhinitis    Allergy to dog dander    Anxiety    Bee sting allergy    History of pregnancy    -SAB; -17 hr labor , Harjinder (MLM); 2010-6 hr labor , \"Kelly\" DWAYNE; 2012-, APGAR:9 (1 min) 9 (5 min)  (10 min)  -1st degree perineal laceration     Menorrhagia with irregular cycle    Prediabetes     Past Surgical History:   Procedure Laterality Date      , 2010, 2012       CURRENT MEDICATIONS  Medications Taking[1]    HEALTH MAINTENANCE  Immunization History   Administered Date(s) Administered    Covid-19 Vaccine Pfizer 30 mcg/0.3 ml 2021, 2021, 2021    FLULAVAL 6 months & older 0.5 ml Prefilled syringe (65402) 2020, 2021    FLUZONE 6 months and older PFS 0.5 ml (66436) 10/05/2016, 2017, 2018, 2019, 2020, 2021    Fluvirin, 3 Years & >, Im 10/22/2014    Influenza 10/01/2009, 10/20/2010, 10/12/2012, 10/17/2022, 10/16/2023    Influenza Vaccine, trivalent (IIV3), PF 0.5mL (95436) 10/30/2024    Influenza Virus Vaccine, H1N1 10/29/2009    TDAP 10/12/2012, 2023       ALLERGIES AND DRUG REACTIONS  Allergies[2]    Family History   Problem Relation Age of Onset    No Known Problems Mother     No Known Problems Father     No Known Problems Sister     No Known Problems Sister     Other (IBS) Sister     Diabetes Maternal Grandmother     Pancreatic Cancer Maternal Grandmother     Anemia Maternal Grandmother     Cancer Maternal Grandmother     No Known Problems Maternal Grandfather     Diabetes Paternal Grandmother     Heart  Disease Paternal Grandfather     Heart Attack Paternal Grandfather      Social History     Socioeconomic History    Marital status:    Tobacco Use    Smoking status: Never     Passive exposure: Never    Smokeless tobacco: Never   Vaping Use    Vaping status: Never Used   Substance and Sexual Activity    Alcohol use: Yes     Alcohol/week: 1.0 standard drink of alcohol     Types: 1 Glasses of wine per week     Comment: 1-2x/mo 1-2 glasses, never a problem    Drug use: No    Sexual activity: Yes     Partners: Male     Birth control/protection: Paragard     Comment: Placed 2022   Other Topics Concern    Caffeine Concern Yes    Stress Concern No    Weight Concern Yes    Special Diet No    Exercise No    Seat Belt Yes       Review of Systems   All other systems reviewed and are negative.         Objective:      LMP  (LMP Unknown)   There is no height or weight on file to calculate BMI.    Physical Exam  Constitutional:       General: She is not in acute distress.     Appearance: She is not ill-appearing or toxic-appearing.   Pulmonary:      Effort: Pulmonary effort is normal.   Musculoskeletal:      Cervical back: Neck supple.   Neurological:      Mental Status: She is alert and oriented to person, place, and time.   Psychiatric:         Mood and Affect: Mood normal.            Assessment and Plan:      1. Class 2 obesity due to excess calories without serious comorbidity with body mass index (BMI) of 36.0 to 36.9 in adult (Primary)  -     Zepbound; Inject 7.5 mg into the skin once a week for 4 doses.  Dispense: 2 mL; Refill: 0    Return in about 1 month (around 4/3/2025) for follow-up.    - tolerating Zepbound 5 mg well  - she states that she is down 3 lbs since starting this dose 1 month ago   - a shared decision was made to continue her dose titration schedule, and a prescription for Zepbound 7.5 mg weekly was sent in for another 4 weeks     Patient verbalized understanding of assessment and recommendations. All  questions and concerns were addressed.  Telehealth appointment with video and audio was scheduled and completed with the patient's informed consent. Telehealth appointment took place in context of CDC social distancing guidelines during the Covid-19 pandemic.  Electronically signed by Mark Aguayo MD       [1]   Outpatient Medications Marked as Taking for the 3/3/25 encounter (Telemedicine) with Mark Aguayo MD   Medication Sig Dispense Refill    Tirzepatide-Weight Management (ZEPBOUND) 7.5 MG/0.5ML Subcutaneous Solution Auto-injector Inject 7.5 mg into the skin once a week for 4 doses. 2 mL 0   [2] No Known Allergies

## 2025-03-25 ENCOUNTER — TELEMEDICINE (OUTPATIENT)
Dept: FAMILY MEDICINE CLINIC | Facility: CLINIC | Age: 45
End: 2025-03-25
Payer: COMMERCIAL

## 2025-03-25 DIAGNOSIS — E66.812 CLASS 2 OBESITY DUE TO EXCESS CALORIES WITHOUT SERIOUS COMORBIDITY WITH BODY MASS INDEX (BMI) OF 36.0 TO 36.9 IN ADULT: Primary | ICD-10-CM

## 2025-03-25 DIAGNOSIS — E66.09 CLASS 2 OBESITY DUE TO EXCESS CALORIES WITHOUT SERIOUS COMORBIDITY WITH BODY MASS INDEX (BMI) OF 36.0 TO 36.9 IN ADULT: Primary | ICD-10-CM

## 2025-03-25 PROCEDURE — 98005 SYNCH AUDIO-VIDEO EST LOW 20: CPT | Performed by: STUDENT IN AN ORGANIZED HEALTH CARE EDUCATION/TRAINING PROGRAM

## 2025-03-25 RX ORDER — TIRZEPATIDE 12.5 MG/.5ML
12.5 INJECTION, SOLUTION SUBCUTANEOUS WEEKLY
Qty: 2 ML | Refills: 0 | Status: SHIPPED | OUTPATIENT
Start: 2025-03-25 | End: 2025-04-16

## 2025-03-25 RX ORDER — TIRZEPATIDE 10 MG/.5ML
10 INJECTION, SOLUTION SUBCUTANEOUS WEEKLY
Qty: 2 ML | Refills: 0 | Status: SHIPPED | OUTPATIENT
Start: 2025-03-25 | End: 2025-04-16

## 2025-03-25 NOTE — PROGRESS NOTES
Subjective:      No chief complaint on file.    HISTORY OF PRESENT ILLNESS  HPI  HPI obtained per patient report.  Nichelle Jane is a pleasant 44 year old female presenting virtually for follow-up for Zepbound.   She is tolerating Zepbound 7.5 mg well without any issues. She feels that this medication has been helping with appetite modulation.     PAST PATIENT HISTORY  Past Medical History:    Allergic rhinitis    Allergy to dog dander    Anxiety    Bee sting allergy    History of pregnancy    -SAB; -17 hr labor , Harjinder (MLM); 2010-6 hr labor , \"Kelly\" DWAYNE; 2012-, APGAR:9 (1 min) 9 (5 min)  (10 min)  -1st degree perineal laceration     Menorrhagia with irregular cycle    Prediabetes     Past Surgical History:   Procedure Laterality Date      , 2010, 2012       CURRENT MEDICATIONS  Medications Taking[1]    HEALTH MAINTENANCE  Immunization History   Administered Date(s) Administered    Covid-19 Vaccine Pfizer 30 mcg/0.3 ml 2021, 2021, 2021    FLULAVAL 6 months & older 0.5 ml Prefilled syringe ([id number]) 2020, 2021    FLUZONE 6 months and older PFS 0.5 ml (50281) 10/05/2016, 2017, 2018, 2019, 2020, 2021    Fluvirin, 3 Years & >, Im 10/22/2014    Influenza 10/01/2009, 10/20/2010, 10/12/2012, 10/17/2022, 10/16/2023    Influenza Vaccine, trivalent (IIV3), PF 0.5mL (55594) 10/30/2024    Influenza Virus Vaccine, H1N1 10/29/2009    TDAP 10/12/2012, 2023       ALLERGIES AND DRUG REACTIONS  Allergies[2]    Family History   Problem Relation Age of Onset    No Known Problems Mother     No Known Problems Father     No Known Problems Sister     No Known Problems Sister     Other (IBS) Sister     Diabetes Maternal Grandmother     Pancreatic Cancer Maternal Grandmother     Anemia Maternal Grandmother     Cancer Maternal Grandmother     No Known Problems Maternal Grandfather     Diabetes Paternal Grandmother     Heart  Disease Paternal Grandfather     Heart Attack Paternal Grandfather      Social History     Socioeconomic History    Marital status:    Tobacco Use    Smoking status: Never     Passive exposure: Never    Smokeless tobacco: Never   Vaping Use    Vaping status: Never Used   Substance and Sexual Activity    Alcohol use: Yes     Alcohol/week: 1.0 standard drink of alcohol     Types: 1 Glasses of wine per week     Comment: 1-2x/mo 1-2 glasses, never a problem    Drug use: No    Sexual activity: Yes     Partners: Male     Birth control/protection: Paragard     Comment: Placed 2022   Other Topics Concern    Caffeine Concern Yes    Stress Concern No    Weight Concern Yes    Special Diet No    Exercise No    Seat Belt Yes       Review of Systems   All other systems reviewed and are negative.         Objective:      LMP  (LMP Unknown)   There is no height or weight on file to calculate BMI.    Physical Exam  Constitutional:       General: She is not in acute distress.     Appearance: She is not ill-appearing or toxic-appearing.   Pulmonary:      Effort: Pulmonary effort is normal.   Musculoskeletal:      Cervical back: Neck supple.   Neurological:      Mental Status: She is alert and oriented to person, place, and time.   Psychiatric:         Mood and Affect: Mood normal.            Assessment and Plan:      1. Class 2 obesity due to excess calories without serious comorbidity with body mass index (BMI) of 36.0 to 36.9 in adult (Primary)  -     Zepbound; Inject 10 mg into the skin once a week for 4 doses.  Dispense: 2 mL; Refill: 0  -     Zepbound; Inject 12.5 mg into the skin once a week for 4 doses. Start this prescription after finishing your prescription for zepbound 10 mg  Dispense: 2 mL; Refill: 0    Return in about 2 months (around 5/25/2025).    - tolerating Zepbound 7.5 mg well  - she states that she is down 5 lbs since starting this dose 1 month ago, a total of 12 lbs since starting zepbound   - a shared  decision was made to continue her dose titration schedule. Prescriptions for zepbound 10 mg weekly for 4 weeks, followed by 12.5 mg weekly thereafter, were sent for her today. We discussed the R/B/A of these dose adjustments  - she was asked to follow-up in 2 months or earlier as needed         Patient verbalized understanding of assessment and recommendations. All questions and concerns were addressed.  Telehealth appointment with video and audio was scheduled and completed with the patient's informed consent. Telehealth appointment took place in context of [facility] social distancing guidelines during the Covid-19 pandemic.  Electronically signed by Mark Aguayo MD       [1]   Outpatient Medications Marked as Taking for the 3/25/25 encounter (Telemedicine) with Mark Aguayo MD   Medication Sig Dispense Refill    Tirzepatide-Weight Management (ZEPBOUND) 10 MG/0.5ML Subcutaneous Solution Auto-injector Inject 10 mg into the skin once a week for 4 doses. 2 mL 0    Tirzepatide-Weight Management (ZEPBOUND) 12.5 MG/0.5ML Subcutaneous Solution Auto-injector Inject 12.5 mg into the skin once a week for 4 doses. Start this prescription after finishing your prescription for zepbound 10 mg 2 mL 0   [2] No Known Allergies

## 2025-04-08 ENCOUNTER — OFFICE VISIT (OUTPATIENT)
Facility: LOCATION | Age: 45
End: 2025-04-08
Payer: COMMERCIAL

## 2025-04-08 VITALS
TEMPERATURE: 98 F | DIASTOLIC BLOOD PRESSURE: 74 MMHG | HEART RATE: 71 BPM | OXYGEN SATURATION: 98 % | RESPIRATION RATE: 18 BRPM | SYSTOLIC BLOOD PRESSURE: 113 MMHG

## 2025-04-08 DIAGNOSIS — K64.8 INTERNAL HEMORRHOIDS: Primary | ICD-10-CM

## 2025-04-08 DIAGNOSIS — K64.4 EXTERNAL HEMORRHOIDS: ICD-10-CM

## 2025-04-08 PROCEDURE — 46600 DIAGNOSTIC ANOSCOPY SPX: CPT | Performed by: STUDENT IN AN ORGANIZED HEALTH CARE EDUCATION/TRAINING PROGRAM

## 2025-04-08 PROCEDURE — 3074F SYST BP LT 130 MM HG: CPT | Performed by: STUDENT IN AN ORGANIZED HEALTH CARE EDUCATION/TRAINING PROGRAM

## 2025-04-08 PROCEDURE — 3078F DIAST BP <80 MM HG: CPT | Performed by: STUDENT IN AN ORGANIZED HEALTH CARE EDUCATION/TRAINING PROGRAM

## 2025-04-08 PROCEDURE — 99202 OFFICE O/P NEW SF 15 MIN: CPT | Performed by: STUDENT IN AN ORGANIZED HEALTH CARE EDUCATION/TRAINING PROGRAM

## 2025-04-08 NOTE — H&P (VIEW-ONLY)
New Patient Visit Note       Active Problems      No diagnosis found.    Chief Complaint   Chief Complaint   Patient presents with    New Patient     NP- Hemorrhoids- states hemorrhoids since childbirth 19 years ago, reports pain, denies bleeding        History of Present Illness   44 year old female who is here for evaluation of anorectal symptoms. She reports mild pain and discomfort at the anus after bowel movements. She denies any current symptoms of bleeding. She has had hemorrhoids over the past 19 years. She reports intermittent episodes over the years of inflamed hemorrhoids that resolve with OTC meds. She is taking fiber supplements consistently. She denies any current symptoms of constipation .        Allergies  Nichelle has No Known Allergies.    Past Medical / Surgical / Social / Family History    The past medical and past surgical history have been reviewed by me today.    Past Medical History:    Allergic rhinitis    Allergy to dog dander    Anxiety    Bee sting allergy    History of pregnancy    -SAB; -17 hr labor , Harjinder (MLM); 2010-6 hr labor , \"Kelly\" DWAYNE; 2012-, APGAR:9 (1 min) 9 (5 min)  (10 min)  -1st degree perineal laceration     Menorrhagia with irregular cycle    Prediabetes     Past Surgical History:   Procedure Laterality Date      , 2010, 2012       The family history and social history have been reviewed by me today.    Family History   Problem Relation Age of Onset    No Known Problems Mother     No Known Problems Father     No Known Problems Sister     No Known Problems Sister     Other (IBS) Sister     Diabetes Maternal Grandmother     Pancreatic Cancer Maternal Grandmother     Anemia Maternal Grandmother     Cancer Maternal Grandmother     No Known Problems Maternal Grandfather     Diabetes Paternal Grandmother     Heart Disease Paternal Grandfather     Heart Attack Paternal Grandfather      Social History     Socioeconomic History     Marital status:    Tobacco Use    Smoking status: Never     Passive exposure: Never    Smokeless tobacco: Never   Vaping Use    Vaping status: Never Used   Substance and Sexual Activity    Alcohol use: Yes     Alcohol/week: 1.0 standard drink of alcohol     Types: 1 Glasses of wine per week     Comment: 1-2x/mo 1-2 glasses, never a problem    Drug use: No    Sexual activity: Yes     Partners: Male     Birth control/protection: Paragard     Comment: Placed 2022   Other Topics Concern    Caffeine Concern Yes    Stress Concern No    Weight Concern Yes    Special Diet No    Exercise No    Seat Belt Yes        Current Outpatient Medications:     Tirzepatide-Weight Management (ZEPBOUND) 10 MG/0.5ML Subcutaneous Solution Auto-injector, Inject 10 mg into the skin once a week for 4 doses., Disp: 2 mL, Rfl: 0    Tirzepatide-Weight Management (ZEPBOUND) 12.5 MG/0.5ML Subcutaneous Solution Auto-injector, Inject 12.5 mg into the skin once a week for 4 doses. Start this prescription after finishing your prescription for zepbound 10 mg, Disp: 2 mL, Rfl: 0    PARAGARD INTRAUTERINE COPPER Intrauterine IUD, by Intrauterine route., Disp: , Rfl:       Review of Systems  The Review of Systems has been reviewed by me during today.  Review of Systems   Constitutional:  Negative for chills, diaphoresis, fatigue and fever.   HENT:  Negative for ear discharge, ear pain and sore throat.    Eyes:  Negative for pain and discharge.   Respiratory:  Negative for cough, chest tightness and shortness of breath.    Cardiovascular:  Negative for chest pain, palpitations and leg swelling.   Gastrointestinal:  Negative for abdominal distention, abdominal pain, blood in stool, constipation, diarrhea, nausea and vomiting.   Genitourinary:  Negative for dysuria, frequency, hematuria and urgency.   Skin:  Negative for color change, pallor and rash.   Neurological:  Negative for weakness, light-headedness, numbness and headaches.   Hematological:   Negative for adenopathy. Does not bruise/bleed easily.   Psychiatric/Behavioral:  Negative for agitation and confusion.        Physical Findings   /74   Pulse 71   Temp 97.9 °F (36.6 °C) (Temporal)   Resp 18   LMP  (LMP Unknown)   SpO2 98%   Physical Exam  Constitutional:       Appearance: Normal appearance.   HENT:      Head: Normocephalic and atraumatic.   Cardiovascular:      Pulses: Normal pulses.   Pulmonary:      Effort: Pulmonary effort is normal.   Skin:     General: Skin is warm.      Capillary Refill: Capillary refill takes less than 2 seconds.   Neurological:      Mental Status: She is alert and oriented to person, place, and time. Mental status is at baseline.        The perineum and perianal skin were examined.   There was skin tags. There is a very prominent skin tag on the right lateral hemorrhoidal complex that is the cause of the patient's symptoms and concern    There are small non enlarged external hemorrhoids.     Digital rectal exam was performed. There was  good resting anal sphincter tone, no palpable mass, and good squeeze strength.        Anoscopy was performed. The anal canal and anorectal ring were examined circumferentially.   There was not anal fissure.  There was not anal fistula internal opening.   There was internal hemorrhoid enlargement small in size and not bleeding .      Assessment and Plan  No diagnosis found.    Nichelle Jane is a 44 year old female referred by Bebeto Hyatt DO for evaluation of anorectal symptoms. She has prominent polypoid anal skin tag on the right lateral hemorrhoidal complex. This is causing pain and discomfort. Discussed with her the treatment options including surgical intervention and discussed the risks and benefits in detail. Patient questions were answered to her satisfaction. Surgery on 4/29     No orders of the defined types were placed in this encounter.      Imaging & Referrals   None    Follow Up  No follow-ups on file.    Katie  MD Gold    CC  , Bebeto Hyatt DO

## 2025-04-08 NOTE — H&P
The following individual(s) verbally consented to be recorded using ambient AI listening technology and understand that they can each withdraw their consent to this listening technology at any point by asking the clinician to turn off or pause the recording:    Patient name: Nichelle Jane  Additional names:

## 2025-04-14 ENCOUNTER — APPOINTMENT (OUTPATIENT)
Dept: ADMINISTRATIVE | Facility: HOSPITAL | Age: 45
End: 2025-04-14
Payer: COMMERCIAL

## 2025-04-16 ENCOUNTER — TELEPHONE (OUTPATIENT)
Facility: LOCATION | Age: 45
End: 2025-04-16

## 2025-04-16 NOTE — TELEPHONE ENCOUNTER
JANETH GILL Patient  Member ID  [id number]    Date of Birth  1980-08-19    Gender  Female    Relationship to Subscriber  Spouse    Subscriber Name  HERRERA GILL    Transaction Type  Outpatient Authorization    Organization  [facility]    Payer  [facility] logo     Certificate Information  Reference Number  [id number]    Status  NO ACTION REQUIRED    Message  Requested Service does not require preauthorization. We would strongly encourage you to check benefits for this service.    Member Information  Patient Name  JANETH GILL    Patient Date of Birth  1980-08-19    Patient Gender  Female    Member ID  [id number]    Relationship to Subscriber  Spouse    Subscriber Name  HERRERA GILL    Requesting Provider     Name  TAVARES PASCUAL    NPI  [id number]    Tax Id  [id number]    Specialty  [id number]    Provider Role  Provider    Address  [address] [place] [address]    Phone  [phone]    Fax  [phone]    Contact Name  KRISTA GOLDSTEIN    Service Information  Service Type  2 - Surgical    Place of Service  22 - On [facility]    Service From - To Date  2025-04-29 - 2025-07-31    Level of Service  Elective    Diagnosis Code 1  K648 - Other hemorrhoids    Diagnosis Code 2  K644 - Residual hemorrhoidal skin tags    Procedure Code 1 (CPT/HCPCS)  82665 - SURG DX EXAM ANORECTAL    Quantity  1 Units    Status  NO ACTION REQUIRED    Procedure Code 2 (CPT/HCPCS)  47629 - LIGATION OF HEMORRHOID(S)    Quantity  1 Units    Status  NO ACTION REQUIRED    Procedure Code 3 (CPT/HCPCS)  30008 - EXCISE ANAL EXT TAG/PAPILLA    Quantity  1 Units    Status  NO ACTION REQUIRED    Procedure Code 4 (CPT/HCPCS)  65519 - REMOVAL OF ANAL TAGS    Quantity  1 Units    Status  NO ACTION REQUIRED

## 2025-04-17 ENCOUNTER — LABORATORY ENCOUNTER (OUTPATIENT)
Dept: LAB | Age: 45
End: 2025-04-17
Attending: STUDENT IN AN ORGANIZED HEALTH CARE EDUCATION/TRAINING PROGRAM
Payer: COMMERCIAL

## 2025-04-17 DIAGNOSIS — Z01.818 PRE-OP TESTING: ICD-10-CM

## 2025-04-17 LAB
CHLORIDE SERPL-SCNC: 105 MMOL/L (ref 98–112)
CO2 SERPL-SCNC: 29 MMOL/L (ref 21–32)
POTASSIUM SERPL-SCNC: 4.4 MMOL/L (ref 3.5–5.1)
SODIUM SERPL-SCNC: 141 MMOL/L (ref 136–145)

## 2025-04-17 PROCEDURE — 80051 ELECTROLYTE PANEL: CPT

## 2025-04-17 PROCEDURE — 36415 COLL VENOUS BLD VENIPUNCTURE: CPT

## 2025-04-29 ENCOUNTER — HOSPITAL ENCOUNTER (OUTPATIENT)
Facility: HOSPITAL | Age: 45
Setting detail: HOSPITAL OUTPATIENT SURGERY
Discharge: HOME | End: 2025-04-29
Attending: STUDENT IN AN ORGANIZED HEALTH CARE EDUCATION/TRAINING PROGRAM | Admitting: STUDENT IN AN ORGANIZED HEALTH CARE EDUCATION/TRAINING PROGRAM
Payer: COMMERCIAL

## 2025-04-29 ENCOUNTER — ANESTHESIA (OUTPATIENT)
Dept: SURGERY | Facility: HOSPITAL | Age: 45
End: 2025-04-29
Payer: COMMERCIAL

## 2025-04-29 ENCOUNTER — ANESTHESIA EVENT (OUTPATIENT)
Dept: SURGERY | Facility: HOSPITAL | Age: 45
End: 2025-04-29
Payer: COMMERCIAL

## 2025-04-29 VITALS
HEIGHT: 65 IN | OXYGEN SATURATION: 96 % | BODY MASS INDEX: 34.93 KG/M2 | DIASTOLIC BLOOD PRESSURE: 80 MMHG | TEMPERATURE: 97 F | RESPIRATION RATE: 14 BRPM | SYSTOLIC BLOOD PRESSURE: 110 MMHG | WEIGHT: 209.63 LBS | HEART RATE: 56 BPM

## 2025-04-29 DIAGNOSIS — K64.4 EXTERNAL HEMORRHOIDS: ICD-10-CM

## 2025-04-29 DIAGNOSIS — K64.8 INTERNAL HEMORRHOIDS: ICD-10-CM

## 2025-04-29 DIAGNOSIS — Z01.818 PRE-OP TESTING: Primary | ICD-10-CM

## 2025-04-29 DIAGNOSIS — G89.18 POSTOPERATIVE PAIN: ICD-10-CM

## 2025-04-29 LAB — B-HCG UR QL: NEGATIVE

## 2025-04-29 PROCEDURE — 46945 INT HRHC LIG 1 HROID W/O IMG: CPT | Performed by: STUDENT IN AN ORGANIZED HEALTH CARE EDUCATION/TRAINING PROGRAM

## 2025-04-29 PROCEDURE — 46230 REMOVAL OF ANAL TAGS: CPT | Performed by: STUDENT IN AN ORGANIZED HEALTH CARE EDUCATION/TRAINING PROGRAM

## 2025-04-29 RX ORDER — HYDROCODONE BITARTRATE AND ACETAMINOPHEN 5; 325 MG/1; MG/1
2 TABLET ORAL ONCE AS NEEDED
Status: DISCONTINUED | OUTPATIENT
Start: 2025-04-29 | End: 2025-04-29

## 2025-04-29 RX ORDER — DIPHENHYDRAMINE HYDROCHLORIDE 50 MG/ML
12.5 INJECTION, SOLUTION INTRAMUSCULAR; INTRAVENOUS AS NEEDED
Status: DISCONTINUED | OUTPATIENT
Start: 2025-04-29 | End: 2025-04-29

## 2025-04-29 RX ORDER — NALOXONE HYDROCHLORIDE 0.4 MG/ML
0.08 INJECTION, SOLUTION INTRAMUSCULAR; INTRAVENOUS; SUBCUTANEOUS AS NEEDED
Status: DISCONTINUED | OUTPATIENT
Start: 2025-04-29 | End: 2025-04-29

## 2025-04-29 RX ORDER — DEXAMETHASONE SODIUM PHOSPHATE 4 MG/ML
VIAL (ML) INJECTION AS NEEDED
Status: DISCONTINUED | OUTPATIENT
Start: 2025-04-29 | End: 2025-04-29 | Stop reason: SURG

## 2025-04-29 RX ORDER — MIDAZOLAM HYDROCHLORIDE 1 MG/ML
1 INJECTION INTRAMUSCULAR; INTRAVENOUS EVERY 5 MIN PRN
Status: DISCONTINUED | OUTPATIENT
Start: 2025-04-29 | End: 2025-04-29

## 2025-04-29 RX ORDER — SODIUM CHLORIDE, SODIUM LACTATE, POTASSIUM CHLORIDE, CALCIUM CHLORIDE 600; 310; 30; 20 MG/100ML; MG/100ML; MG/100ML; MG/100ML
INJECTION, SOLUTION INTRAVENOUS CONTINUOUS
Status: DISCONTINUED | OUTPATIENT
Start: 2025-04-29 | End: 2025-04-29

## 2025-04-29 RX ORDER — BUPIVACAINE HYDROCHLORIDE 2.5 MG/ML
INJECTION, SOLUTION EPIDURAL; INFILTRATION; INTRACAUDAL; PERINEURAL AS NEEDED
Status: DISCONTINUED | OUTPATIENT
Start: 2025-04-29 | End: 2025-04-29 | Stop reason: HOSPADM

## 2025-04-29 RX ORDER — SCOPOLAMINE 1 MG/3D
1 PATCH, EXTENDED RELEASE TRANSDERMAL ONCE
Status: DISCONTINUED | OUTPATIENT
Start: 2025-04-29 | End: 2025-04-29 | Stop reason: HOSPADM

## 2025-04-29 RX ORDER — ACETAMINOPHEN 500 MG
1000 TABLET ORAL ONCE AS NEEDED
Status: DISCONTINUED | OUTPATIENT
Start: 2025-04-29 | End: 2025-04-29

## 2025-04-29 RX ORDER — HYDROMORPHONE HYDROCHLORIDE 1 MG/ML
0.6 INJECTION, SOLUTION INTRAMUSCULAR; INTRAVENOUS; SUBCUTANEOUS EVERY 5 MIN PRN
Status: DISCONTINUED | OUTPATIENT
Start: 2025-04-29 | End: 2025-04-29

## 2025-04-29 RX ORDER — LABETALOL HYDROCHLORIDE 5 MG/ML
5 INJECTION, SOLUTION INTRAVENOUS EVERY 5 MIN PRN
Status: DISCONTINUED | OUTPATIENT
Start: 2025-04-29 | End: 2025-04-29

## 2025-04-29 RX ORDER — ROCURONIUM BROMIDE 10 MG/ML
INJECTION, SOLUTION INTRAVENOUS AS NEEDED
Status: DISCONTINUED | OUTPATIENT
Start: 2025-04-29 | End: 2025-04-29 | Stop reason: SURG

## 2025-04-29 RX ORDER — NEOSTIGMINE METHYLSULFATE 1 MG/ML
INJECTION INTRAVENOUS AS NEEDED
Status: DISCONTINUED | OUTPATIENT
Start: 2025-04-29 | End: 2025-04-29 | Stop reason: SURG

## 2025-04-29 RX ORDER — GLYCOPYRROLATE 0.2 MG/ML
INJECTION, SOLUTION INTRAMUSCULAR; INTRAVENOUS AS NEEDED
Status: DISCONTINUED | OUTPATIENT
Start: 2025-04-29 | End: 2025-04-29 | Stop reason: SURG

## 2025-04-29 RX ORDER — HYDROMORPHONE HYDROCHLORIDE 1 MG/ML
0.4 INJECTION, SOLUTION INTRAMUSCULAR; INTRAVENOUS; SUBCUTANEOUS EVERY 5 MIN PRN
Status: DISCONTINUED | OUTPATIENT
Start: 2025-04-29 | End: 2025-04-29

## 2025-04-29 RX ORDER — ACETAMINOPHEN 500 MG
1000 TABLET ORAL ONCE
Status: DISCONTINUED | OUTPATIENT
Start: 2025-04-29 | End: 2025-04-29 | Stop reason: HOSPADM

## 2025-04-29 RX ORDER — PROCHLORPERAZINE EDISYLATE 5 MG/ML
5 INJECTION INTRAMUSCULAR; INTRAVENOUS EVERY 8 HOURS PRN
Status: DISCONTINUED | OUTPATIENT
Start: 2025-04-29 | End: 2025-04-29

## 2025-04-29 RX ORDER — OXYCODONE HYDROCHLORIDE 5 MG/1
5 TABLET ORAL EVERY 6 HOURS PRN
Qty: 5 TABLET | Refills: 0 | Status: SHIPPED | OUTPATIENT
Start: 2025-04-29

## 2025-04-29 RX ORDER — LIDOCAINE HYDROCHLORIDE 10 MG/ML
INJECTION, SOLUTION EPIDURAL; INFILTRATION; INTRACAUDAL; PERINEURAL AS NEEDED
Status: DISCONTINUED | OUTPATIENT
Start: 2025-04-29 | End: 2025-04-29 | Stop reason: SURG

## 2025-04-29 RX ORDER — MEPERIDINE HYDROCHLORIDE 25 MG/ML
12.5 INJECTION INTRAMUSCULAR; INTRAVENOUS; SUBCUTANEOUS AS NEEDED
Status: DISCONTINUED | OUTPATIENT
Start: 2025-04-29 | End: 2025-04-29

## 2025-04-29 RX ORDER — KETOROLAC TROMETHAMINE 30 MG/ML
INJECTION, SOLUTION INTRAMUSCULAR; INTRAVENOUS AS NEEDED
Status: DISCONTINUED | OUTPATIENT
Start: 2025-04-29 | End: 2025-04-29 | Stop reason: SURG

## 2025-04-29 RX ORDER — ONDANSETRON 2 MG/ML
INJECTION INTRAMUSCULAR; INTRAVENOUS AS NEEDED
Status: DISCONTINUED | OUTPATIENT
Start: 2025-04-29 | End: 2025-04-29 | Stop reason: SURG

## 2025-04-29 RX ORDER — HYDROCODONE BITARTRATE AND ACETAMINOPHEN 5; 325 MG/1; MG/1
1 TABLET ORAL ONCE AS NEEDED
Status: DISCONTINUED | OUTPATIENT
Start: 2025-04-29 | End: 2025-04-29

## 2025-04-29 RX ORDER — MIDAZOLAM HYDROCHLORIDE 1 MG/ML
INJECTION INTRAMUSCULAR; INTRAVENOUS AS NEEDED
Status: DISCONTINUED | OUTPATIENT
Start: 2025-04-29 | End: 2025-04-29 | Stop reason: SURG

## 2025-04-29 RX ORDER — METRONIDAZOLE 500 MG/100ML
500 INJECTION, SOLUTION INTRAVENOUS ONCE
Status: COMPLETED | OUTPATIENT
Start: 2025-04-29 | End: 2025-04-29

## 2025-04-29 RX ORDER — HYDROMORPHONE HYDROCHLORIDE 1 MG/ML
0.2 INJECTION, SOLUTION INTRAMUSCULAR; INTRAVENOUS; SUBCUTANEOUS EVERY 5 MIN PRN
Status: DISCONTINUED | OUTPATIENT
Start: 2025-04-29 | End: 2025-04-29

## 2025-04-29 RX ORDER — ONDANSETRON 2 MG/ML
4 INJECTION INTRAMUSCULAR; INTRAVENOUS EVERY 6 HOURS PRN
Status: DISCONTINUED | OUTPATIENT
Start: 2025-04-29 | End: 2025-04-29

## 2025-04-29 RX ORDER — HEPARIN SODIUM 5000 [USP'U]/ML
5000 INJECTION, SOLUTION INTRAVENOUS; SUBCUTANEOUS ONCE
Status: COMPLETED | OUTPATIENT
Start: 2025-04-29 | End: 2025-04-29

## 2025-04-29 RX ADMIN — GLYCOPYRROLATE 0.4 MG: 0.2 INJECTION, SOLUTION INTRAMUSCULAR; INTRAVENOUS at 13:32:00

## 2025-04-29 RX ADMIN — LIDOCAINE HYDROCHLORIDE 5 ML: 10 INJECTION, SOLUTION EPIDURAL; INFILTRATION; INTRACAUDAL; PERINEURAL at 12:55:00

## 2025-04-29 RX ADMIN — MIDAZOLAM HYDROCHLORIDE 2 MG: 1 INJECTION INTRAMUSCULAR; INTRAVENOUS at 12:50:00

## 2025-04-29 RX ADMIN — NEOSTIGMINE METHYLSULFATE 3 MG: 1 INJECTION INTRAVENOUS at 13:32:00

## 2025-04-29 RX ADMIN — DEXAMETHASONE SODIUM PHOSPHATE 4 MG: 4 MG/ML VIAL (ML) INJECTION at 12:58:00

## 2025-04-29 RX ADMIN — ROCURONIUM BROMIDE 30 MG: 10 INJECTION, SOLUTION INTRAVENOUS at 12:55:00

## 2025-04-29 RX ADMIN — METRONIDAZOLE 500 MG: 500 INJECTION, SOLUTION INTRAVENOUS at 13:03:00

## 2025-04-29 RX ADMIN — ONDANSETRON 4 MG: 2 INJECTION INTRAMUSCULAR; INTRAVENOUS at 13:17:00

## 2025-04-29 RX ADMIN — SODIUM CHLORIDE, SODIUM LACTATE, POTASSIUM CHLORIDE, CALCIUM CHLORIDE: 600; 310; 30; 20 INJECTION, SOLUTION INTRAVENOUS at 12:50:00

## 2025-04-29 RX ADMIN — KETOROLAC TROMETHAMINE 30 MG: 30 INJECTION, SOLUTION INTRAMUSCULAR; INTRAVENOUS at 13:25:00

## 2025-04-29 NOTE — ANESTHESIA PROCEDURE NOTES
Peripheral IV  Date/Time: 4/29/2025 1:00 PM  Inserted by: Melissa Peck MD    Placement  Needle size: 20 G  Laterality: right  Location: hand  Local anesthetic: none  Site prep: alcohol  Technique: anatomical landmarks  Attempts: 1

## 2025-04-29 NOTE — ANESTHESIA POSTPROCEDURE EVALUATION
[facility] Patient Status:  Hospital Outpatient Surgery   Age/Gender 44 year old female MRN [id number]   Location [facility] SURGERY Attending Katie Malcolm MD   Hosp Day # 0 PCP Bebeto Hyatt DO       Anesthesia Post-op Note    ANAL EXAMINATION UNDER ANESTHESIA,  ANAL SKIN TAG EXCISION X2    Procedure Summary       Date: 04/29/25 Room / Location:  MAIN OR 01 / [facility] MAIN OR    Anesthesia Start: 1250 Anesthesia Stop: 1344    Procedure: ANAL EXAMINATION UNDER ANESTHESIA,  ANAL SKIN TAG EXCISION X2 (Anus) Diagnosis:       Internal hemorrhoids      External hemorrhoids      (Internal hemorrhoids [K64.8]External hemorrhoids [K64.4])    Surgeons: Katie Malcolm MD Anesthesiologist: Melissa Peck MD    Anesthesia Type: general ASA Status: 2            Anesthesia Type: general    Vitals Value Taken Time   /67 04/29/25 13:50   Temp 97.5 04/29/25 13:51   Pulse 63 04/29/25 13:51   Resp 16 04/29/25 13:51   SpO2 96 % 04/29/25 13:51   Vitals shown include unfiled device data.        Patient Location: PACU    Anesthesia Type: general    Airway Patency: patent and extubated    Postop Pain Control: adequate    Mental Status: preanesthetic baseline    Nausea/Vomiting: none    Cardiopulmonary/Hydration status: stable euvolemic    Complications: no apparent anesthesia related complications    Postop vital signs: stable    Dental Exam: Unchanged from Preop    Patient to be discharged from PACU when criteria met.

## 2025-04-29 NOTE — OPERATIVE REPORT
[facility]  Operative Report    Nichelle aJne Location: OR   [facility] [id number] MRN [id number]   Admission Date 4/29/2025 Operation Date 4/29/2025   Attending Physician Katie Malcolm MD Operating Physician Katie Malcolm MD       Patient Name: Nichelle Jane    Preoperative Diagnosis: Internal hemorrhoids [K64.8]  External hemorrhoids [K64.4]    Postoperative Diagnosis: Same as pre-op diagnosis    Surgeons and Role:     * Katie Malcolm MD - Primary    Anesthesia: General    Anesthesiologist: Anesthesiologist.: Melissa Peck MD    Procedures: Anal exam under anesthesia. Excision of skin tag x 2     Implants: None    Specimen: skin tags     Drains: None    Estimated Blood Loss: 10 mL     Complications: None    Condition: Good    Indications for Surgery:   Nichelle Jane is a 44 year old female who presented to me with symptoms of pain and discomfort related to anal skin tags. She denies any symptoms of hematochezia or melena. Please see full H and P for details. She is here for elective excision of anal skin tags and possible hemorrhoidal banding. The risks of anorectal surgery were discussed with the patient and include but are not limited to severe or chronic post-operative pain, hemorrhage, infection or sepsis, incision dehiscence,  incontinence, anal stricture, recurrent disease, need for additional surgical intervention. The patient voiced understanding and agreed to proceed.       Surgical Findings:   Two enlarged polypoid skin tags noted on the right lateral position and the right posterior position.   Very small asymptomatic internal hemorrhoids     Description of Procedure:   The patient was transported to the operating room and general endotracheal anesthesia was administered. The patient was then placed in a prone position on the operating room table. The hips were flexed in the jackknife position, the buttocks were taped apart, and the perineum was prepped with Betadine paint. Sterile  drapes were placed with wide exposure of the perineum. Pre-operative antibiotics were given. A time-out was performed.    The anus was gently dilated with serially larger Hill-Snell retractors. Anoscopy was performed to reveal the above listed finding.  The skin tags were elevated and then resected with a sharp blade. Hemostasis was achieved with electrocautery. The anal mucosa and anoderm was reapproximated with 2-0 Chromic suture. The internal hemorrhoid in the right posterior column was then ligated using a 3-0 Vicryl at the base of out excision.      The perineum was cleansed and dried and dressed with 4x4's and paper tape. The patient was flipped back onto the hospital bed and anesthesia was terminated. The patient was transported to the recovery area in good condition without apparent intraoperative complication. All sponge, needle, and instrument counts were correct at the end of the case.    Katie Malcolm MD   4/29/2025  1:35 PM

## 2025-04-29 NOTE — ANESTHESIA PREPROCEDURE EVALUATION
PRE-OP EVALUATION    Patient Name: Nichelle Jane    Admit Diagnosis: Internal hemorrhoids [K64.8]  External hemorrhoids [K64.4]    Pre-op Diagnosis: Internal hemorrhoids [K64.8]  External hemorrhoids [K64.4]    ANAL EXAMINATION UNDER ANESTHESIA, POSSIBLE HEMORRHOID BANDING, ANAL SKIN TAG EXCISION    Anesthesia Procedure: ANAL EXAMINATION UNDER ANESTHESIA, POSSIBLE HEMORRHOID BANDING, ANAL SKIN TAG EXCISION    Surgeons and Role:     * Katie Malcolm MD - Primary    Pre-op vitals reviewed.  Temp: 98.1 °F (36.7 °C)  Pulse: 76  Resp: 16  BP: 96/65  SpO2: 99 %  Body mass index is 34.88 kg/m².    Current medications reviewed.  Hospital Medications:  Current Medications[1]    Outpatient Medications:   Prescriptions Prior to Admission[2]    Allergies: Patient has no known allergies.      Anesthesia Evaluation    Patient summary reviewed.    Anesthetic Complications  (-) history of anesthetic complications         GI/Hepatic/Renal    Negative GI/hepatic/renal ROS.                             Cardiovascular        Exercise tolerance: good     MET: >4    (+) obesity                                       Endo/Other    Negative endo/other ROS.                              Pulmonary    Negative pulmonary ROS.                       Neuro/Psych        (+) anxiety         (+) neuromuscular disease                 Past Surgical History[3]  Social Hx on file[4]  History   Drug Use No     Available pre-op labs reviewed.     Lab Results   Component Value Date     04/17/2025    K 4.4 04/17/2025     04/17/2025    CO2 29.0 04/17/2025            Airway      Mallampati: II  Mouth opening: >3 FB  TM distance: > 6 cm   Cardiovascular    Cardiovascular exam normal.         Dental             Pulmonary    Pulmonary exam normal.                 Other findings        ASA: 2   Plan: general  NPO status verified and patient meets guidelines.          Plan/risks discussed with: patient            Present on  Admission:  **None**               [1]  • [Transfer Hold] acetaminophen (Tylenol Extra Strength) tab 1,000 mg  1,000 mg Oral Once   • [Transfer Hold] scopolamine (Transderm-Scop) 1 MG/3DAYS patch 1 patch  1 patch Transdermal Once   • lactated ringers infusion   Intravenous Continuous   • [COMPLETED] heparin (Porcine) 5000 UNIT/ML injection 5,000 Units  5,000 Units Subcutaneous Once   • ceFAZolin (Ancef) 2g in 10mL IV syringe premix  2 g Intravenous Once    And   • metroNIDAZOLE in sodium chloride 0.79% (Flagyl) 5 mg/mL IVPB premix 500 mg  500 mg Intravenous Once   [2]  Medications Prior to Admission   Medication Sig Dispense Refill Last Dose/Taking   • [] Tirzepatide-Weight Management (ZEPBOUND) 10 MG/0.5ML Subcutaneous Solution Auto-injector Inject 10 mg into the skin once a week for 4 doses. 2 mL 0 2025   • PARAGARD INTRAUTERINE COPPER Intrauterine IUD by Intrauterine route.   Taking   • [] Tirzepatide-Weight Management (ZEPBOUND) 12.5 MG/0.5ML Subcutaneous Solution Auto-injector Inject 12.5 mg into the skin once a week for 4 doses. Start this prescription after finishing your prescription for zepbound 10 mg (Patient not taking: Reported on 2025) 2 mL 0 Not Taking   • [] Tirzepatide-Weight Management (ZEPBOUND) 7.5 MG/0.5ML Subcutaneous Solution Auto-injector Inject 7.5 mg into the skin once a week for 4 doses. (Patient not taking: Reported on 2025) 2 mL 0 Not Taking   • [] Tirzepatide-Weight Management (ZEPBOUND) 5 MG/0.5ML Subcutaneous Solution Auto-injector Inject 5 mg into the skin once a week for 4 doses. (Patient not taking: Reported on 2025) 2 mL 0 Not Taking   [3]  Past Surgical History:  Procedure Laterality Date   •   , 2010, 2012    WITH PITOCIN DURING LABOR   [4]  Social History  Socioeconomic History   • Marital status:    Tobacco Use   • Smoking status: Never     Passive exposure: Never   • Smokeless tobacco: Never   Vaping Use    • Vaping status: Never Used   Substance and Sexual Activity   • Alcohol use: Yes     Alcohol/week: 1.0 standard drink of alcohol     Types: 1 Glasses of wine per week     Comment: 1-2x/mo 1-2 glasses, never a problem   • Drug use: No   • Sexual activity: Yes     Partners: Male     Birth control/protection: Paragard     Comment: Placed 2022   Other Topics Concern   • Caffeine Concern Yes   • Stress Concern No   • Weight Concern Yes   • Special Diet No   • Exercise No   • Seat Belt Yes

## 2025-04-29 NOTE — DISCHARGE INSTRUCTIONS
Home Care Instructions  Anal Skin Tag Removal  Dr. Katie Malcolm     WHAT TO EXPECT  -Complete recovery after hemorrhoid surgery can take up to 6 or 8 weeks or more.     -During the first week you may experience intense anal pain. By the second week your pain will be significantly improved.    -A small amount of bleeding is common following hemorrhoid surgery. A sanitary napkin or gauze may be worn between buttocks over the anal opening to catch any drainage. If there is prolonged or profuse bleeding with passage of clots, call the office immediately.     -To avoid constipation take Metamucil or other fiber supplement product (Benefiber, Citrucel, Konsyl, etc) one teaspoon daily. Take a stool softener such as miralax once daily. If you have not had a bowel movement in 48 hours following surgery, Start taking Miralax every 4 - 6 hours until you have a bowel movement. If another 24 hours passes without bowel movement, drink one bottle of magnesium citrate. Following the first bowel movement, you should have a bowel movement at least every other day. If 2 days pass without a bowel movement, take an ounce of milk of magnesia. Repeat in 6 hours if no result.     -Multiple loose stools can irritate the anus and your incisions. If you begin having loose stools, stop taking any stool softeners (miralax, magnesium citrate, milk of magnesia). You can continue to take the fiber supplements. The goal should be 1 - 3 formed bowel movements daily.    -Difficulty urinating after hemorrhoidectomy is common. Getting the pain under control and relaxing the pelvic floor muscles usually allows for the urine to pass. While soaking in a warm bath, attempt to relax the bladder and urinate into the water. If you are unable to urinate in the first eight hours after your surgery, notify the doctor’s office. After hours, go to the nearest emergency room or urgent care center. A bladder catheter will be placed and remain in place for 2 days,  you may call the office to have the catheter removed.     MEDICATIONS  -You can take Tylenol 1000mg (2 Extra Strength Tylenol) every 6 hours for pain. For the first 48 hours it is best to take the Tylenol every 6 hours even if you do not feel much pain.     -For moderate to severe post-operative pain control you will be prescribed Oxycodone. Take one pill every six hours as needed for pain. If you do not feel that narcotics are necessary you shouldn’t take them. If the pain is severe then you may take two pills every six hours, taking care not to exceed 8 pills in a 24 hour period.     -You may also add Ibuprofen 800mg every 8 hours or Naproxen 400 - 500 mg every 12 hours.    -Do not take Aspirin for seven days after surgery. Ask your surgeon about restarting any other blood thinners.    -All other home medications may be resumed as scheduled.     WOUND CARE  -Your incisions will be closed with stitches that will dissolve over time. Occasionally these may need to be trimmed in the office. It is possible that the incisions will separate and expose the underlying tissue. This may prolong healing but overall is not an emergency as long as there is not excessive bleeding.     -Take warm water baths, either in a bathtub or a Sitz basin, 3-4 times daily for 15 - 20 minutes, and after each bowel movement.  This will help relax the pelvic floor muscles, soothe your pain,  cleanse the anus, and help you urinate.     -You should also ideally take a quick bath or shower after each bowel movement to keep the anus clean. You may also use a Shital bottle to irrigate the anus. If you must wipe after bowel movements, be very gentle and moisten the toilet tissue with water.    -Avoid applying any ointments or lubricants to the anus. Do not use baby wipe or other medicated wipes. The chemicals in these items can irritate the anus and surrounding skin.     ACTIVITY   -Avoid exercise or strenuous activity for 1-2 weeks after your  procedure. Walking, going up and down stairs, and sitting gently are ok to do.     -Do not sit on a hemorrhoid donut or other circular pad with a hole in the middle, these cause increased pressure and increased pain and bleeding and incision tearing. Instead, sit on a soft pad or pillow.    DIET   -Eat a regular diet including plenty of fresh fruit and vegetables. Drink 6-8 glasses of water a day. Avoid spicy foods.      APPOINTMENT  Please call our office as soon as possible for an appointment at [phone]. Please call our office immediately for fever greater than 100.5, extensive bleeding, inability to urinate.  For life threatening emergencies such as severe chest pain, shortness of breath, loss of conciousness call 911. For non-emergent care please call our office after 8:30 a.m. Monday through Friday. The number above directs to the answering service after hours to reach the on-call physician.        You received a drug called Toradol which is an Anti Inflammatory at: 1:25pm  If you are allowed to take Anti inflammatories:    Do not take any Anti Inflammatory like Motrin, Aleve or Ibuprophen until after: 7:25pm  Please report any suspected allergic reactions or bleeding issues to your doctor

## 2025-04-29 NOTE — ANESTHESIA PROCEDURE NOTES
Airway  Date/Time: 4/29/2025 12:57 PM  Reason: elective      General Information and Staff   Patient location during procedure: OR  Anesthesiologist: Melissa Peck MD  Performed: anesthesiologist   Performed by: Melissa Peck MD  Authorized by: Melissa Peck MD        Indications and Patient Condition  Indications for airway management: anesthesia  Sedation level: deep      Preoxygenated: yesPatient position: sniffing    Mask difficulty assessment: 1 - vent by mask    Final Airway Details    Final airway type: endotracheal airway    Successful airway: ETT  Cuffed: yes   Successful intubation technique: direct laryngoscopy  Endotracheal tube insertion site: oral  Blade: Montse  Blade size: #3  ETT size (mm): 7.0    Cormack-Lehane Classification: grade I - full view of glottis  Placement verified by: capnometry   Measured from: lips  ETT to lips (cm): 22  Number of attempts at approach: 1

## 2025-05-16 NOTE — H&P
New Patient Visit Note       Active Problems      No diagnosis found.    Chief Complaint   Chief Complaint   Patient presents with    New Patient     NP- Hemorrhoids- states hemorrhoids since childbirth 19 years ago, reports pain, denies bleeding        History of Present Illness   44 year old female who is here for evaluation of anorectal symptoms. She reports mild pain and discomfort at the anus after bowel movements. She denies any current symptoms of bleeding. She has had hemorrhoids over the past 19 years. She reports intermittent episodes over the years of inflamed hemorrhoids that resolve with OTC meds. She is taking fiber supplements consistently. She denies any current symptoms of constipation .        Allergies  Nichelle has No Known Allergies.    Past Medical / Surgical / Social / Family History    The past medical and past surgical history have been reviewed by me today.    Past Medical History:    Allergic rhinitis    Allergy to dog dander    Anxiety    Bee sting allergy    History of pregnancy    -SAB; -17 hr labor , Harjinder (MLM); 2010-6 hr labor , \"Kelly\" DWAYNE; 2012-, APGAR:9 (1 min) 9 (5 min)  (10 min)  -1st degree perineal laceration     Menorrhagia with irregular cycle    Prediabetes     Past Surgical History:   Procedure Laterality Date      , 2010, 2012       The family history and social history have been reviewed by me today.    Family History   Problem Relation Age of Onset    No Known Problems Mother     No Known Problems Father     No Known Problems Sister     No Known Problems Sister     Other (IBS) Sister     Diabetes Maternal Grandmother     Pancreatic Cancer Maternal Grandmother     Anemia Maternal Grandmother     Cancer Maternal Grandmother     No Known Problems Maternal Grandfather     Diabetes Paternal Grandmother     Heart Disease Paternal Grandfather     Heart Attack Paternal Grandfather      Social History     Socioeconomic History     Marital status:    Tobacco Use    Smoking status: Never     Passive exposure: Never    Smokeless tobacco: Never   Vaping Use    Vaping status: Never Used   Substance and Sexual Activity    Alcohol use: Yes     Alcohol/week: 1.0 standard drink of alcohol     Types: 1 Glasses of wine per week     Comment: 1-2x/mo 1-2 glasses, never a problem    Drug use: No    Sexual activity: Yes     Partners: Male     Birth control/protection: Paragard     Comment: Placed 2022   Other Topics Concern    Caffeine Concern Yes    Stress Concern No    Weight Concern Yes    Special Diet No    Exercise No    Seat Belt Yes        Current Outpatient Medications:     Tirzepatide-Weight Management (ZEPBOUND) 10 MG/0.5ML Subcutaneous Solution Auto-injector, Inject 10 mg into the skin once a week for 4 doses., Disp: 2 mL, Rfl: 0    Tirzepatide-Weight Management (ZEPBOUND) 12.5 MG/0.5ML Subcutaneous Solution Auto-injector, Inject 12.5 mg into the skin once a week for 4 doses. Start this prescription after finishing your prescription for zepbound 10 mg, Disp: 2 mL, Rfl: 0    PARAGARD INTRAUTERINE COPPER Intrauterine IUD, by Intrauterine route., Disp: , Rfl:       Review of Systems  The Review of Systems has been reviewed by me during today.  Review of Systems   Constitutional:  Negative for chills, diaphoresis, fatigue and fever.   HENT:  Negative for ear discharge, ear pain and sore throat.    Eyes:  Negative for pain and discharge.   Respiratory:  Negative for cough, chest tightness and shortness of breath.    Cardiovascular:  Negative for chest pain, palpitations and leg swelling.   Gastrointestinal:  Negative for abdominal distention, abdominal pain, blood in stool, constipation, diarrhea, nausea and vomiting.   Genitourinary:  Negative for dysuria, frequency, hematuria and urgency.   Skin:  Negative for color change, pallor and rash.   Neurological:  Negative for weakness, light-headedness, numbness and headaches.   Hematological:   Negative for adenopathy. Does not bruise/bleed easily.   Psychiatric/Behavioral:  Negative for agitation and confusion.        Physical Findings   /74   Pulse 71   Temp 97.9 °F (36.6 °C) (Temporal)   Resp 18   LMP  (LMP Unknown)   SpO2 98%   Physical Exam  Constitutional:       Appearance: Normal appearance.   HENT:      Head: Normocephalic and atraumatic.   Cardiovascular:      Pulses: Normal pulses.   Pulmonary:      Effort: Pulmonary effort is normal.   Skin:     General: Skin is warm.      Capillary Refill: Capillary refill takes less than 2 seconds.   Neurological:      Mental Status: She is alert and oriented to person, place, and time. Mental status is at baseline.        The perineum and perianal skin were examined.   There was skin tags. There is a very prominent skin tag on the right lateral hemorrhoidal complex that is the cause of the patient's symptoms and concern    There are small non enlarged external hemorrhoids.     Digital rectal exam was performed. There was  good resting anal sphincter tone, no palpable mass, and good squeeze strength.        Anoscopy was performed. The anal canal and anorectal ring were examined circumferentially.   There was not anal fissure.  There was not anal fistula internal opening.   There was internal hemorrhoid enlargement small in size and not bleeding .      Assessment and Plan  No diagnosis found.    Nichelle Jane is a 44 year old female referred by Bebeto Hyatt DO for evaluation of anorectal symptoms. She has prominent polypoid anal skin tag on the right lateral hemorrhoidal complex. This is causing pain and discomfort. Discussed with her the treatment options including surgical intervention and discussed the risks and benefits in detail. Patient questions were answered to her satisfaction. Surgery on 4/29     No orders of the defined types were placed in this encounter.      Imaging & Referrals   None    Follow Up  No follow-ups on file.    Katie  MD Gold    CC  , Bebeto Hyatt DO   PEDIATRICS

## 2025-05-29 ENCOUNTER — OFFICE VISIT (OUTPATIENT)
Dept: FAMILY MEDICINE CLINIC | Facility: CLINIC | Age: 45
End: 2025-05-29
Payer: COMMERCIAL

## 2025-05-29 VITALS
WEIGHT: 201 LBS | BODY MASS INDEX: 33.49 KG/M2 | RESPIRATION RATE: 16 BRPM | SYSTOLIC BLOOD PRESSURE: 118 MMHG | HEIGHT: 65 IN | HEART RATE: 79 BPM | OXYGEN SATURATION: 97 % | TEMPERATURE: 97 F | DIASTOLIC BLOOD PRESSURE: 80 MMHG

## 2025-05-29 DIAGNOSIS — E78.00 HYPERCHOLESTEREMIA: ICD-10-CM

## 2025-05-29 DIAGNOSIS — E66.09 CLASS 2 OBESITY DUE TO EXCESS CALORIES WITHOUT SERIOUS COMORBIDITY WITH BODY MASS INDEX (BMI) OF 36.0 TO 36.9 IN ADULT: Primary | ICD-10-CM

## 2025-05-29 DIAGNOSIS — E66.812 CLASS 2 OBESITY DUE TO EXCESS CALORIES WITHOUT SERIOUS COMORBIDITY WITH BODY MASS INDEX (BMI) OF 36.0 TO 36.9 IN ADULT: Primary | ICD-10-CM

## 2025-05-29 DIAGNOSIS — R73.03 PREDIABETES: ICD-10-CM

## 2025-05-29 DIAGNOSIS — E28.319 EARLY MENOPAUSE OCCURRING IN PATIENT AGE YOUNGER THAN 45 YEARS: ICD-10-CM

## 2025-05-29 PROCEDURE — 3074F SYST BP LT 130 MM HG: CPT | Performed by: STUDENT IN AN ORGANIZED HEALTH CARE EDUCATION/TRAINING PROGRAM

## 2025-05-29 PROCEDURE — 3008F BODY MASS INDEX DOCD: CPT | Performed by: STUDENT IN AN ORGANIZED HEALTH CARE EDUCATION/TRAINING PROGRAM

## 2025-05-29 PROCEDURE — 3079F DIAST BP 80-89 MM HG: CPT | Performed by: STUDENT IN AN ORGANIZED HEALTH CARE EDUCATION/TRAINING PROGRAM

## 2025-05-29 PROCEDURE — 99214 OFFICE O/P EST MOD 30 MIN: CPT | Performed by: STUDENT IN AN ORGANIZED HEALTH CARE EDUCATION/TRAINING PROGRAM

## 2025-05-29 RX ORDER — TIRZEPATIDE 15 MG/.5ML
15 INJECTION, SOLUTION SUBCUTANEOUS WEEKLY
Qty: 2 ML | Refills: 2 | Status: SHIPPED | OUTPATIENT
Start: 2025-05-29 | End: 2027-02-12

## 2025-05-29 RX ORDER — TIRZEPATIDE 12.5 MG/.5ML
INJECTION, SOLUTION SUBCUTANEOUS
COMMUNITY

## 2025-05-29 NOTE — PROGRESS NOTES
Subjective:        Chief Complaint   Patient presents with    Weight Check     Taking Zepbound 12.5mg - denies any issues with medication     HISTORY OF PRESENT ILLNESS  HPI  HPI obtained per patient report.  Nichelle Jane is a pleasant 44 year old female presenting for follow-up for weight management.   She has been tolerating zepbound 12.5 mg well. She feels that the medication has been helpful for appetite modulation. She also feels more energy and less knee pain, so it has been easier for her to keep active. She is administering her injection on her abdomen.     PAST PATIENT HISTORY  Past Medical History[1]  Past Surgical History[2]    CURRENT MEDICATIONS  Medications Taking[3]    HEALTH MAINTENANCE  Immunization History   Administered Date(s) Administered    Covid-19 Vaccine Pfizer 30 mcg/0.3 ml 02/25/2021, 03/19/2021, 12/02/2021    FLULAVAL 6 months & older 0.5 ml Prefilled syringe (63416) 09/23/2020, 09/23/2021    FLUZONE 6 months and older PFS 0.5 ml (64430) 10/05/2016, 09/25/2017, 09/25/2018, 09/27/2019, 09/23/2020, 09/23/2021    Fluvirin, 3 Years & >, Im 10/22/2014    Influenza 10/01/2009, 10/20/2010, 10/12/2012, 10/17/2022, 10/16/2023    Influenza Vaccine, trivalent (IIV3), PF 0.5mL (03197) 10/30/2024    Influenza Virus Vaccine, H1N1 10/29/2009    TDAP 10/12/2012, 11/17/2023       ALLERGIES AND DRUG REACTIONS  Allergies[4]    Family History[5]  Short Social Hx on File[6]    Review of Systems   All other systems reviewed and are negative.         Objective:      /80   Pulse 79   Temp 97.3 °F (36.3 °C) (Temporal)   Resp 16   Ht 5' 5\" (1.651 m)   Wt 201 lb (91.2 kg)   LMP  (LMP Unknown)   SpO2 97%   BMI 33.45 kg/m²   Body mass index is 33.45 kg/m².    Physical Exam  Vitals reviewed.   Constitutional:       General: She is not in acute distress.     Appearance: She is not ill-appearing, toxic-appearing or diaphoretic.   HENT:      Head: Normocephalic and atraumatic.   Eyes:      General: No  scleral icterus.        Right eye: No discharge.         Left eye: No discharge.      Extraocular Movements: Extraocular movements intact.      Conjunctiva/sclera: Conjunctivae normal.   Cardiovascular:      Rate and Rhythm: Normal rate.   Pulmonary:      Effort: Pulmonary effort is normal.   Abdominal:      General: There is no distension.      Palpations: Abdomen is soft. There is no mass.      Tenderness: There is no abdominal tenderness. There is no guarding or rebound.      Hernia: No hernia is present.   Musculoskeletal:      Cervical back: Neck supple.      Right lower leg: No edema.      Left lower leg: No edema.   Skin:     General: Skin is warm and dry.      Coloration: Skin is not jaundiced or pale.      Findings: No bruising, erythema or rash.   Neurological:      Mental Status: She is alert and oriented to person, place, and time.   Psychiatric:         Mood and Affect: Mood normal.            Assessment and Plan:      1. Class 2 obesity due to excess calories without serious comorbidity with body mass index (BMI) of 36.0 to 36.9 in adult (Primary)  -     Zepbound; Inject 15 mg into the skin once a week for 90 doses.  Dispense: 2 mL; Refill: 2  2. Early menopause occurring in patient age younger than 45 years  -     Zepbound; Inject 15 mg into the skin once a week for 90 doses.  Dispense: 2 mL; Refill: 2  3. Prediabetes  -     Zepbound; Inject 15 mg into the skin once a week for 90 doses.  Dispense: 2 mL; Refill: 2  4. Hypercholesteremia  -     Zepbound; Inject 15 mg into the skin once a week for 90 doses.  Dispense: 2 mL; Refill: 2    Return in about 3 months (around 8/29/2025) for follow-up.    - she has been tolerating zepbound well   - she is down 21 lbs since starting Zepbound in January 2025, in conjunction with healthy dietary modifications and regular exercise  - we will increase her dose to the maintenance dose of 15 mg weekly. If she tolerates this dose adjustment well without any issues, she  may continue this dose for 3 months  - continue healthy lifestyle modifications   - she is asked to follow-up in 3 months     Patient verbalized understanding of assessment and recommendations. All questions and concerns were addressed.    Electronically signed by Mark Aguayo MD         [1]   Past Medical History:   Allergic rhinitis    Allergy to dog dander    Anxiety    Bee sting allergy    History of pregnancy    -SAB; -17 hr labor , Harjinder (MLM); 2010-6 hr labor , \"Kelly\" DWAYNE; 2012-, APGAR:9 (1 min) 9 (5 min)  (10 min)  -1st degree perineal laceration     Menorrhagia with irregular cycle    Prediabetes   [2]   Past Surgical History:  Procedure Laterality Date      , 2010, 2012    WITH PITOCIN DURING LABOR   [3]   Outpatient Medications Marked as Taking for the 25 encounter (Office Visit) with Mark Aguayo MD   Medication Sig Dispense Refill    Tirzepatide-Weight Management (ZEPBOUND) 12.5 MG/0.5ML Subcutaneous Solution Auto-injector Inject into the skin.      Tirzepatide-Weight Management (ZEPBOUND) 15 MG/0.5ML Subcutaneous Solution Auto-injector Inject 15 mg into the skin once a week for 90 doses. 2 mL 2    PARAGARD INTRAUTERINE COPPER Intrauterine IUD by Intrauterine route.     [4] No Known Allergies  [5]   Family History  Problem Relation Age of Onset    No Known Problems Mother     No Known Problems Father     No Known Problems Sister     No Known Problems Sister     Other (IBS) Sister     Diabetes Maternal Grandmother     Pancreatic Cancer Maternal Grandmother     Anemia Maternal Grandmother     Cancer Maternal Grandmother     No Known Problems Maternal Grandfather     Diabetes Paternal Grandmother     Heart Disease Paternal Grandfather     Heart Attack Paternal Grandfather    [6]   Social History  Socioeconomic History    Marital status:    Tobacco Use    Smoking status: Never     Passive exposure: Never    Smokeless tobacco: Never   Vaping Use     Vaping status: Never Used   Substance and Sexual Activity    Alcohol use: Yes     Alcohol/week: 1.0 standard drink of alcohol     Types: 1 Glasses of wine per week     Comment: 1-2x/mo 1-2 glasses, never a problem    Drug use: No    Sexual activity: Yes     Partners: Male     Birth control/protection: Paragard     Comment: Placed 2022   Other Topics Concern    Caffeine Concern Yes    Stress Concern No    Weight Concern Yes    Special Diet No    Exercise No    Seat Belt Yes

## 2025-07-05 ENCOUNTER — TELEMEDICINE (OUTPATIENT)
Dept: FAMILY MEDICINE CLINIC | Facility: CLINIC | Age: 45
End: 2025-07-05
Payer: COMMERCIAL

## 2025-07-05 DIAGNOSIS — R73.03 PREDIABETES: ICD-10-CM

## 2025-07-05 DIAGNOSIS — E66.09 CLASS 2 OBESITY DUE TO EXCESS CALORIES WITHOUT SERIOUS COMORBIDITY WITH BODY MASS INDEX (BMI) OF 36.0 TO 36.9 IN ADULT: Primary | ICD-10-CM

## 2025-07-05 DIAGNOSIS — E78.00 HYPERCHOLESTEREMIA: ICD-10-CM

## 2025-07-05 DIAGNOSIS — E66.812 CLASS 2 OBESITY DUE TO EXCESS CALORIES WITHOUT SERIOUS COMORBIDITY WITH BODY MASS INDEX (BMI) OF 36.0 TO 36.9 IN ADULT: Primary | ICD-10-CM

## 2025-07-05 PROCEDURE — 98006 SYNCH AUDIO-VIDEO EST MOD 30: CPT | Performed by: STUDENT IN AN ORGANIZED HEALTH CARE EDUCATION/TRAINING PROGRAM

## 2025-07-05 RX ORDER — TIRZEPATIDE 15 MG/.5ML
15 INJECTION, SOLUTION SUBCUTANEOUS WEEKLY
Qty: 6 ML | Refills: 1 | Status: SHIPPED | OUTPATIENT
Start: 2025-07-05 | End: 2025-08-04

## 2025-07-05 NOTE — PROGRESS NOTES
Subjective:      No chief complaint on file.    HISTORY OF PRESENT ILLNESS  HPI  HPI obtained per patient report.  Nichelle Jane is a pleasant 44 year old female presenting virtually for follow-up for weight management.     She is tolerating zepbound 15 mg weekly well without any issues. She states that her most recent home weight measurement was 195 lbs, down an additional 6 lbs since her LOV 1 month ago, at which her dose was increased.    PAST PATIENT HISTORY  Past Medical History[1]  Past Surgical History[2]    CURRENT MEDICATIONS  Medications Taking[3]    HEALTH MAINTENANCE  Immunization History   Administered Date(s) Administered    Covid-19 Vaccine Pfizer 30 mcg/0.3 ml 02/25/2021, 03/19/2021, 12/02/2021    FLULAVAL 6 months & older 0.5 ml Prefilled syringe ([id number]) 09/23/2020, 09/23/2021    FLUZONE 6 months and older PFS 0.5 ml ([id number]) 10/05/2016, 09/25/2017, 09/25/2018, 09/27/2019, 09/23/2020, 09/23/2021    Fluvirin, 3 Years & >, Im 10/22/2014    Influenza 10/01/2009, 10/20/2010, 10/12/2012, 10/17/2022, 10/16/2023    Influenza Vaccine, trivalent (IIV3), PF 0.5mL ([id number]) 10/30/2024    Influenza Virus Vaccine, H1N1 10/29/2009    TDAP 10/12/2012, 11/17/2023       ALLERGIES AND DRUG REACTIONS  Allergies[4]    Family History[5]  Short Social Hx on File[6]    Review of Systems   All other systems reviewed and are negative.         Objective:      LMP  (LMP Unknown)   There is no height or weight on file to calculate BMI.    Physical Exam  Constitutional:       General: She is not in acute distress.     Appearance: She is not ill-appearing or toxic-appearing.   Pulmonary:      Effort: Pulmonary effort is normal.   Musculoskeletal:      Cervical back: Neck supple.   Neurological:      Mental Status: She is alert and oriented to person, place, and time.   Psychiatric:         Mood and Affect: Mood normal.            Assessment and Plan:      1. Class 2 obesity due to excess calories without serious comorbidity with  body mass index (BMI) of 36.0 to 36.9 in adult (Primary)  -     Zepbound; Inject 15 mg into the skin once a week. Start this dose after finishing your prescription for Zepbound 12.5 mg  Dispense: 6 mL; Refill: 1  2. Prediabetes  -     Zepbound; Inject 15 mg into the skin once a week. Start this dose after finishing your prescription for Zepbound 12.5 mg  Dispense: 6 mL; Refill: 1  3. Hypercholesteremia  -     Zepbound; Inject 15 mg into the skin once a week. Start this dose after finishing your prescription for Zepbound 12.5 mg  Dispense: 6 mL; Refill: 1    Return in about 4 months (around 2025).    - she has been tolerating zepbound well, including the 15 mg weekly dose to which she was increased last month  - she is down additional 6 lbs since her LOV 1 month ago  - a shared decision was made to continue her current zepbound in conjunction with continuation of healthy lifestyle modifications   - longer-term prescriptions have been sent for her   - she is asked to follow-up in 4-6 months     Patient verbalized understanding of assessment and recommendations. All questions and concerns were addressed.    Electronically signed by Mark Aguayo MD         [1]   Past Medical History:   Allergic rhinitis    Allergy to dog dander    Anxiety    Bee sting allergy    History of pregnancy    2004-SAB; -17 hr labor , Harjinder ([facility]); 2010-6 hr labor , \"Kelly\" DWAYNE; 2012-, APGAR:9 (1 min) 9 (5 min)  (10 min)  -1st degree perineal laceration     Menorrhagia with irregular cycle    Prediabetes   [2]   Past Surgical History:  Procedure Laterality Date      , 2010, 2012    WITH PITOCIN DURING LABOR   [3]   Outpatient Medications Marked as Taking for the 25 encounter (Telemedicine) with Mark Aguayo MD   Medication Sig Dispense Refill    Tirzepatide-Weight Management (ZEPBOUND) 15 MG/0.5ML Subcutaneous Solution Auto-injector Inject 15 mg into the skin once a week. Start this dose after  finishing your prescription for Zepbound 12.5 mg 6 mL 1   [4] No Known Allergies  [5]   Family History  Problem Relation Age of Onset    No Known Problems Mother     No Known Problems Father     No Known Problems Sister     No Known Problems Sister     Other (IBS) Sister     Diabetes Maternal Grandmother     Pancreatic Cancer Maternal Grandmother     Anemia Maternal Grandmother     Cancer Maternal Grandmother     No Known Problems Maternal Grandfather     Diabetes Paternal Grandmother     Heart Disease Paternal Grandfather     Heart Attack Paternal Grandfather    [6]   Social History  Socioeconomic History    Marital status:    Tobacco Use    Smoking status: Never     Passive exposure: Never    Smokeless tobacco: Never   Vaping Use    Vaping status: Never Used   Substance and Sexual Activity    Alcohol use: Yes     Alcohol/week: 1.0 standard drink of alcohol     Types: 1 Glasses of wine per week     Comment: 1-2x/mo 1-2 glasses, never a problem    Drug use: No    Sexual activity: Yes     Partners: Male     Birth control/protection: Paragard     Comment: Placed 2022   Other Topics Concern    Caffeine Concern Yes    Stress Concern No    Weight Concern Yes    Special Diet No    Exercise No    Seat Belt Yes

## 2025-07-30 ENCOUNTER — OFFICE VISIT (OUTPATIENT)
Dept: OBGYN CLINIC | Facility: CLINIC | Age: 45
End: 2025-07-30

## 2025-07-30 VITALS
WEIGHT: 194.13 LBS | DIASTOLIC BLOOD PRESSURE: 72 MMHG | BODY MASS INDEX: 32.35 KG/M2 | HEIGHT: 65 IN | SYSTOLIC BLOOD PRESSURE: 114 MMHG | HEART RATE: 85 BPM

## 2025-07-30 DIAGNOSIS — N95.0 PMB (POSTMENOPAUSAL BLEEDING): Primary | ICD-10-CM

## 2025-07-30 DIAGNOSIS — Z30.431 ENCOUNTER FOR ROUTINE CHECKING OF INTRAUTERINE CONTRACEPTIVE DEVICE (IUD): ICD-10-CM

## 2025-07-30 DIAGNOSIS — Z12.4 SCREENING FOR CERVICAL CANCER: ICD-10-CM

## 2025-07-30 DIAGNOSIS — E28.319 MENOPAUSE, PREMATURE: ICD-10-CM

## 2025-07-30 PROCEDURE — 3008F BODY MASS INDEX DOCD: CPT | Performed by: OBSTETRICS & GYNECOLOGY

## 2025-07-30 PROCEDURE — 88175 CYTOPATH C/V AUTO FLUID REDO: CPT | Performed by: OBSTETRICS & GYNECOLOGY

## 2025-07-30 PROCEDURE — 99459 PELVIC EXAMINATION: CPT | Performed by: OBSTETRICS & GYNECOLOGY

## 2025-07-30 PROCEDURE — 99214 OFFICE O/P EST MOD 30 MIN: CPT | Performed by: OBSTETRICS & GYNECOLOGY

## 2025-07-30 PROCEDURE — 3078F DIAST BP <80 MM HG: CPT | Performed by: OBSTETRICS & GYNECOLOGY

## 2025-07-30 PROCEDURE — 87624 HPV HI-RISK TYP POOLED RSLT: CPT | Performed by: OBSTETRICS & GYNECOLOGY

## 2025-07-30 PROCEDURE — 3074F SYST BP LT 130 MM HG: CPT | Performed by: OBSTETRICS & GYNECOLOGY

## 2025-07-31 LAB — HPV E6+E7 MRNA CVX QL NAA+PROBE: NEGATIVE

## 2025-08-07 ENCOUNTER — LAB ENCOUNTER (OUTPATIENT)
Dept: LAB | Age: 45
End: 2025-08-07
Attending: OBSTETRICS & GYNECOLOGY

## 2025-08-07 DIAGNOSIS — N95.0 PMB (POSTMENOPAUSAL BLEEDING): ICD-10-CM

## 2025-08-07 LAB
ESTRADIOL SERPL-MCNC: 24.2 PG/ML
FSH SERPL-ACNC: 70.8 MIU/ML
LH SERPL-ACNC: 41 MIU/ML

## 2025-08-07 PROCEDURE — 36415 COLL VENOUS BLD VENIPUNCTURE: CPT

## 2025-08-07 PROCEDURE — 83002 ASSAY OF GONADOTROPIN (LH): CPT

## 2025-08-07 PROCEDURE — 83001 ASSAY OF GONADOTROPIN (FSH): CPT

## 2025-08-07 PROCEDURE — 82670 ASSAY OF TOTAL ESTRADIOL: CPT

## (undated) DEVICE — PACK CDS RECTAL

## (undated) DEVICE — GAUZE,SPONGE,4"X4",12PLY,STERILE,LF,2'S: Brand: MEDLINE

## (undated) DEVICE — GLOVE SUR 7.5 SENSICARE PI PIP GRN PWD F

## (undated) DEVICE — UNDERPANTS MAT 2XL FOR 24-64IN WAIST PUR

## (undated) DEVICE — GLOVE SUR 7 SENSICARE PI PIP CRM PWD F

## (undated) DEVICE — ANTIBACTERIAL UNDYED BRAIDED (POLYGLACTIN 910), SYNTHETIC ABSORBABLE SUTURE: Brand: COATED VICRYL

## (undated) DEVICE — SLEEVE COMPR MD KNEE LEN SGL USE KENDALL SCD

## (undated) DEVICE — SYRINGE MED 10ML LL TIP W/O SFTY DISP

## (undated) DEVICE — ADHESIVE SKIN TOP FOR WND CLSR DERMBND ADV

## (undated) DEVICE — SUT CHRM GUT 2-0 27IN SH ABSRB UD 26MM 1/2

## (undated) DEVICE — SOLUTION IRRIG 1000ML 0.9% NACL USP BTL

## (undated) NOTE — MR AVS SNAPSHOT
[place]  [address]  [phone]               Thank you for choosing us for your health care visit with Mirian Hudson MD.  We are glad to serve you and happy to provide you with this summa view more details from this visit by going to [email or web address]. If you've recently had a stay at the Hospital you can access your discharge instructions in [employer] by going to Visits < Admission Summaries.  If you've been to the Emergency Depar

## (undated) NOTE — MR AVS SNAPSHOT
[facility] [employer] [facility]  [address], [address] [facility]  [address] [facility] [phone]               Thank you for choosing us for your health care visit with Brando Vora MD.  We are glad to serve you and happy to provide you with this summary of your

## (undated) NOTE — LETTER
25    Patient: Nichelle Jane  : 1980 Visit date: 2025    Dear  Bebeto Hyatt DO    Thank you for referring Nichelle Jane to my practice.  Please find my assessment and plan below.     Assessment and Plan  No diagnosis found.    Nichelle Jane is a 44 year old female referred by Bebeto Hyatt DO for evaluation of anorectal symptoms. She has prominent polypoid anal skin tag on the right lateral hemorrhoidal complex. This is causing pain and discomfort. Discussed with her the treatment options including surgical intervention and discussed the risks and benefits in detail. Patient questions were answered to her satisfaction. Surgery on       Sincerely,       Katie Malcolm MD   CC: No Recipients

## (undated) NOTE — MR AVS SNAPSHOT
[place]  [address]  [phone]               Thank you for choosing us for your health care visit with Gretel Guillory MD.  We are glad to serve you and happy to provide you with this summa office, you can view your past visit information in [employer] by going to Visits < Visit Summaries. [employer] questions? Call [phone] for help. [employer] is NOT to be used for urgent needs. For medical emergencies, dial 911.            Visit [facility]

## (undated) NOTE — MR AVS SNAPSHOT
[facility]  [address] [place] [address], [address] [place] [address]               Thank you for choosing us for your health care visit with Citlaly Meyer MD.  We are glad to serve you and happy to provide you with this summary Assoc Dx:  Pain in both wrists [M25.531, A44.633]           Cyclic Citrullinate Pep. IGG [E]    Complete by:  Feb 28, 2017 (Approximate)    Assoc Dx:  Pain in both wrists [M25.531, M25.532]           CBC W Differential W Platelet [E]    Complete by:   Feb Reason for Today's Visit     Hand Pain           Medical Issues Discussed Today     Obesity due to excess calories    Pain in both wrists      Instructions and Information about Your Health     None      Allergies as of Feb 28, 2017     Not on File Tips for increasing your physical activity – Adults who are physically active are less likely to develop some chronic diseases than adults who are inactive.      HOW TO GET STARTED: HOW TO STAY MOTIVATED:   Start activities slowly and build up over time Do